# Patient Record
Sex: MALE | Race: WHITE | NOT HISPANIC OR LATINO | Employment: FULL TIME | URBAN - METROPOLITAN AREA
[De-identification: names, ages, dates, MRNs, and addresses within clinical notes are randomized per-mention and may not be internally consistent; named-entity substitution may affect disease eponyms.]

---

## 2017-01-21 ENCOUNTER — OFFICE VISIT (OUTPATIENT)
Dept: URGENT CARE | Facility: CLINIC | Age: 27
End: 2017-01-21
Payer: COMMERCIAL

## 2017-01-21 VITALS
HEIGHT: 72 IN | OXYGEN SATURATION: 97 % | BODY MASS INDEX: 26.41 KG/M2 | HEART RATE: 79 BPM | RESPIRATION RATE: 16 BRPM | TEMPERATURE: 98.9 F | DIASTOLIC BLOOD PRESSURE: 80 MMHG | WEIGHT: 195 LBS | SYSTOLIC BLOOD PRESSURE: 110 MMHG

## 2017-01-21 DIAGNOSIS — M25.551 PAIN OF BOTH HIP JOINTS: ICD-10-CM

## 2017-01-21 DIAGNOSIS — M25.552 PAIN OF BOTH HIP JOINTS: ICD-10-CM

## 2017-01-21 DIAGNOSIS — R09.82 POST-NASAL DRAINAGE: ICD-10-CM

## 2017-01-21 PROCEDURE — 99214 OFFICE O/P EST MOD 30 MIN: CPT | Performed by: PHYSICIAN ASSISTANT

## 2017-01-21 RX ORDER — CETIRIZINE HYDROCHLORIDE 10 MG/1
10 TABLET ORAL DAILY
Qty: 30 TAB | Refills: 1 | Status: SHIPPED | OUTPATIENT
Start: 2017-01-21 | End: 2017-02-07 | Stop reason: SDUPTHER

## 2017-01-21 RX ORDER — FLUTICASONE PROPIONATE 50 MCG
2 SPRAY, SUSPENSION (ML) NASAL DAILY
Qty: 16 G | Refills: 0 | Status: SHIPPED | OUTPATIENT
Start: 2017-01-21 | End: 2017-02-07 | Stop reason: SDUPTHER

## 2017-01-21 ASSESSMENT — ENCOUNTER SYMPTOMS
VOMITING: 0
FEVER: 0
CHILLS: 0
LEG PAIN: 1
TINGLING: 0
NAUSEA: 0

## 2017-01-21 NOTE — PROGRESS NOTES
"Subjective:      Cal Delgadillo is a 26 y.o. male who presents with Leg Pain            Leg Pain  Pertinent negatives include no chills, fever, nausea or vomiting.   last 3-4wks of sorethroat, had flu over xmas, denies ear pain, did have sinus press few weeks ago - resolved. Denies nausea/vomiting/abd pain/diarrhea/rash. Denies PMH of strep. Denies PMH of asthma/bronchitis/pneumonia. C/o raspy - not sore throat, denies seasonal allerg. Tried cough drops/tea. Tried dayquil. Was sick, does not feel sick anymore, now irritated w/ throat, not painful but bothersome in am.     Family hx of beckers muscular dystrophy. Waxing waning lifetime of pain to bilat quads/hips. Denies injury or progression of pain, would like to r/o muscular dystrophy w/ pcp - needs help establishing.      Review of Systems   Constitutional: Negative for fever and chills.   Gastrointestinal: Negative for nausea and vomiting.   Musculoskeletal: Positive for joint pain ( POS for mild pain to bilat hips for years).   Neurological: Negative for tingling.       PMH:  has no past medical history on file.  MEDS:   Current outpatient prescriptions:   •  ibuprofen (MOTRIN) 200 MG Tab, Take 200 mg by mouth every 6 hours as needed., Disp: , Rfl:   ALLERGIES:   Allergies   Allergen Reactions   • Septra Ds [Bactrim Ds] Unspecified     Pt was very young, doesn't remember reaction.     SURGHX: History reviewed. No pertinent past surgical history.  SOCHX:  reports that he has never smoked. He uses smokeless tobacco.  FH: Family history was reviewed, no pertinent findings to report    I have worn a mask for the entire encounter with this patient.      Objective:     /80 mmHg  Pulse 79  Temp(Src) 37.2 °C (98.9 °F)  Resp 16  Ht 1.831 m (6' 0.1\")  Wt 88.451 kg (195 lb)  BMI 26.38 kg/m2  SpO2 97%     Physical Exam   Constitutional: He is oriented to person, place, and time. He appears well-developed and well-nourished. No distress.   HENT:   Head: " Normocephalic and atraumatic.   Right Ear: External ear and ear canal normal. Tympanic membrane is bulging.   Left Ear: External ear and ear canal normal. Tympanic membrane is bulging.   Nose: Nose normal. Right sinus exhibits no maxillary sinus tenderness and no frontal sinus tenderness. Left sinus exhibits no maxillary sinus tenderness and no frontal sinus tenderness.   Mouth/Throat: Uvula is midline and mucous membranes are normal. Posterior oropharyngeal erythema ( mild PND) present. No oropharyngeal exudate, posterior oropharyngeal edema or tonsillar abscesses.   Eyes: Conjunctivae are normal. Right eye exhibits no discharge. Left eye exhibits no discharge. No scleral icterus.   Neck: Neck supple.   Pulmonary/Chest: Effort normal. No respiratory distress. He has no decreased breath sounds. He has no wheezes. He has no rhonchi. He has no rales.   Musculoskeletal: Normal range of motion.   Lymphadenopathy:     He has cervical adenopathy ( mild bilat).   Neurological: He is alert and oriented to person, place, and time. He exhibits normal muscle tone. Coordination normal.   Skin: Skin is warm and dry. He is not diaphoretic. No pallor.   Psychiatric: He has a normal mood and affect.   Nursing note and vitals reviewed.              Assessment/Plan:     1. Post-nasal drainage  Supportive care is reviewed with patient/caregiver - recommend to push PO fluids and electrolytes, Nsaids/tylenol, netti pot/saline irrig, humidifier in home, flonase, ponaris, antihistamines, suspect mild URI / seasonal allerg contributing to PND, f/u w/ PCP for further work up  Return to clinic with lack of resolution or progression of symptoms.    - REFERRAL TO FAMILY PRACTICE  - fluticasone (FLONASE) 50 MCG/ACT nasal spray; Spray 2 Sprays in nose every day.  Dispense: 16 g; Refill: 0  - cetirizine (ZYRTEC) 10 MG Tab; Take 1 Tab by mouth every day.  Dispense: 30 Tab; Refill: 1    2. Pain of both hip joints    - REFERRAL TO FAMILY  PRACTICE

## 2017-01-21 NOTE — MR AVS SNAPSHOT
"        Cal Delgadillo   2017 2:10 PM   Office Visit   MRN: [id number]    Department:  [facility]   Dept Phone:  [phone]    Description:  Male : 1990   Provider:  Jose Trent PA-C           Reason for Visit     Leg Pain in both legs, thighs & hips on & off for 4 years      Allergies as of 2017     Allergen Noted Reactions    Septra Ds [Bactrim Ds] 2016   Unspecified    Pt was very young, doesn't remember reaction.      You were diagnosed with     Post-nasal drainage   [076004]       Pain of both hip joints   [4195688]         Vital Signs     Blood Pressure Pulse Temperature Respirations Height Weight    110/80 mmHg 79 37.2 °C (98.9 °F) 16 1.831 m (6' 0.1\") 88.451 kg (195 lb)    Body Mass Index Oxygen Saturation Smoking Status             26.38 kg/m2 97% Never Smoker          Basic Information     Date Of Birth Sex Race Ethnicity Preferred Language    1990 Male White Unknown English      Health Maintenance        Date Due Completion Dates    IMM HEP B VACCINE (1 of 3 - Primary Series) 1990 ---    IMM HEP A VACCINE (1 of 2 - Standard Series) 1991 ---    IMM HPV VACCINE (1 of 3 - Male 3 Dose Series) 2001 ---    IMM VARICELLA (CHICKENPOX) VACCINE (1 of 2 - 2 Dose Adolescent Series) 2003 ---    IMM DTaP/Tdap/Td Vaccine (1 - Tdap) 2009 ---    IMM INFLUENZA (1) 2016 ---            Current Immunizations     No immunizations on file.      Below and/or attached are the medications your provider expects you to take. Review all of your home medications and newly ordered medications with your provider and/or pharmacist. Follow medication instructions as directed by your provider and/or pharmacist. Please keep your medication list with you and share with your provider. Update the information when medications are discontinued, doses are changed, or new medications (including over-the-counter products) are added; and carry medication information at all times " in the event of emergency situations     Allergies:  SEPTRA DS - Unspecified               Medications  Valid as of: January 21, 2017 -  2:36 PM    Generic Name Brand Name Tablet Size Instructions for use    Cetirizine HCl (Tab) ZYRTEC 10 MG Take 1 Tab by mouth every day.        Fluticasone Propionate (Suspension) FLONASE 50 MCG/ACT Spray 2 Sprays in nose every day.        Ibuprofen (Tab) MOTRIN 200 MG Take 200 mg by mouth every 6 hours as needed.        .                 Medicines prescribed today were sent to:     None      Medication refill instructions:       If your prescription bottle indicates you have medication refills left, it is not necessary to call your provider’s office. Please contact your pharmacy and they will refill your medication.    If your prescription bottle indicates you do not have any refills left, you may request refills at any time through one of the following ways: The online [employer] system (except Urgent Care), by calling your provider’s office, or by asking your pharmacy to contact your provider’s office with a refill request. Medication refills are processed only during regular business hours and may not be available until the next business day. Your provider may request additional information or to have a follow-up visit with you prior to refilling your medication.   *Please Note: Medication refills are assigned a new Rx number when refilled electronically. Your pharmacy may indicate that no refills were authorized even though a new prescription for the same medication is available at the pharmacy. Please request the medicine by name with the pharmacy before contacting your provider for a refill.        Referral     A referral request has been sent to our patient care coordination department. Please allow 3-5 business days for us to process this request and contact you either by phone or mail. If you do not hear from us by the 5th business day, please call us at [phone].             [employer] ([employer]) Access Code: [email or web address]  Expires: 2/20/2017  2:36 PM    [employer] (SC[employer])  A secure, online tool to manage your health information     [employer]’s [employer] (SCNL)® is a secure, online tool that connects you to your personalized health information from the privacy of your home -- day or night - making it very easy for you to manage your healthcare. Once the activation process is completed, you can even access your medical information using the [employer] (SCNL) gladys, which is available for free in the Apple Gladys store or Google Play store.     [employer] (SC[employer]) provides the following levels of access (as shown below):   My Chart Features   [facility] Primary Care Doctor [facility]  Specialists [facility]  Urgent  Care Non-[facility]  Primary Care  Doctor   Email your healthcare team securely and privately 24/7 X X X    Manage appointments: schedule your next appointment; view details of past/upcoming appointments X      Request prescription refills. X      View recent personal medical records, including lab and immunizations X X X X   View health record, including health history, allergies, medications X X X X   Read reports about your outpatient visits, procedures, consult and ER notes X X X X   See your discharge summary, which is a recap of your hospital and/or ER visit that includes your diagnosis, lab results, and care plan. X X       How to register for [employer] (SC[employer]):  1. Go to  [email or web address].  2. Click on the Sign Up Now box, which takes you to the New Member Sign Up page. You will need to provide the following information:  a. Enter your [employer] (SCNL) Access Code exactly as it appears at the top of this page. (You will not need to use this code after you’ve completed the sign-up process. If you do not sign up before the expiration date, you must request a new code.)   b. Enter your date of birth.   c. Enter your home email address.   d. Click Submit, and follow the next screen’s instructions.  3. Create a [employer] (SCNL) ID. This will be your  [employer] login ID and cannot be changed, so think of one that is secure and easy to remember.  4. Create a [employer] password. You can change your password at any time.  5. Enter your Password Reset Question and Answer. This can be used at a later time if you forget your password.   6. Enter your e-mail address. This allows you to receive e-mail notifications when new information is available in [email or web address].  7. Click Sign Up. You can now view your health information.    For assistance activating your [email or web address] account, call [phone]

## 2017-02-07 ENCOUNTER — HOSPITAL ENCOUNTER (OUTPATIENT)
Dept: LAB | Facility: MEDICAL CENTER | Age: 27
End: 2017-02-07
Attending: FAMILY MEDICINE
Payer: COMMERCIAL

## 2017-02-07 ENCOUNTER — OFFICE VISIT (OUTPATIENT)
Dept: MEDICAL GROUP | Facility: MEDICAL CENTER | Age: 27
End: 2017-02-07
Payer: COMMERCIAL

## 2017-02-07 VITALS
HEART RATE: 83 BPM | WEIGHT: 191.8 LBS | BODY MASS INDEX: 25.98 KG/M2 | TEMPERATURE: 99.1 F | HEIGHT: 72 IN | OXYGEN SATURATION: 97 % | SYSTOLIC BLOOD PRESSURE: 106 MMHG | DIASTOLIC BLOOD PRESSURE: 58 MMHG

## 2017-02-07 DIAGNOSIS — R09.82 POST-NASAL DRIP: ICD-10-CM

## 2017-02-07 DIAGNOSIS — M62.81 MUSCLE WEAKNESS: ICD-10-CM

## 2017-02-07 DIAGNOSIS — R09.82 POST-NASAL DRAINAGE: ICD-10-CM

## 2017-02-07 LAB
BASOPHILS # BLD AUTO: 0.4 % (ref 0–1.8)
BASOPHILS # BLD: 0.02 K/UL (ref 0–0.12)
CK SERPL-CCNC: 157 U/L (ref 0–154)
CRP SERPL HS-MCNC: <0.02 MG/DL (ref 0–0.75)
EOSINOPHIL # BLD AUTO: 0.21 K/UL (ref 0–0.51)
EOSINOPHIL NFR BLD: 4.3 % (ref 0–6.9)
ERYTHROCYTE [DISTWIDTH] IN BLOOD BY AUTOMATED COUNT: 40.3 FL (ref 35.9–50)
ERYTHROCYTE [SEDIMENTATION RATE] IN BLOOD BY WESTERGREN METHOD: 2 MM/HOUR (ref 0–15)
HCT VFR BLD AUTO: 39.7 % (ref 42–52)
HGB BLD-MCNC: 14 G/DL (ref 14–18)
IMM GRANULOCYTES # BLD AUTO: 0 K/UL (ref 0–0.11)
IMM GRANULOCYTES NFR BLD AUTO: 0 % (ref 0–0.9)
LYMPHOCYTES # BLD AUTO: 1.96 K/UL (ref 1–4.8)
LYMPHOCYTES NFR BLD: 39.8 % (ref 22–41)
MCH RBC QN AUTO: 30.6 PG (ref 27–33)
MCHC RBC AUTO-ENTMCNC: 35.3 G/DL (ref 33.7–35.3)
MCV RBC AUTO: 86.7 FL (ref 81.4–97.8)
MONOCYTES # BLD AUTO: 0.3 K/UL (ref 0–0.85)
MONOCYTES NFR BLD AUTO: 6.1 % (ref 0–13.4)
NEUTROPHILS # BLD AUTO: 2.43 K/UL (ref 1.82–7.42)
NEUTROPHILS NFR BLD: 49.4 % (ref 44–72)
NRBC # BLD AUTO: 0 K/UL
NRBC BLD AUTO-RTO: 0 /100 WBC
PLATELET # BLD AUTO: 242 K/UL (ref 164–446)
PMV BLD AUTO: 10.4 FL (ref 9–12.9)
RBC # BLD AUTO: 4.58 M/UL (ref 4.7–6.1)
WBC # BLD AUTO: 4.9 K/UL (ref 4.8–10.8)

## 2017-02-07 PROCEDURE — 85652 RBC SED RATE AUTOMATED: CPT

## 2017-02-07 PROCEDURE — 82550 ASSAY OF CK (CPK): CPT

## 2017-02-07 PROCEDURE — 86140 C-REACTIVE PROTEIN: CPT

## 2017-02-07 PROCEDURE — 36415 COLL VENOUS BLD VENIPUNCTURE: CPT

## 2017-02-07 PROCEDURE — 85025 COMPLETE CBC W/AUTO DIFF WBC: CPT

## 2017-02-07 PROCEDURE — 99214 OFFICE O/P EST MOD 30 MIN: CPT | Performed by: FAMILY MEDICINE

## 2017-02-07 RX ORDER — CETIRIZINE HYDROCHLORIDE 10 MG/1
10 TABLET ORAL DAILY
Qty: 30 TAB | Refills: 1 | Status: SHIPPED | OUTPATIENT
Start: 2017-02-07 | End: 2017-05-01

## 2017-02-07 RX ORDER — FLUTICASONE PROPIONATE 50 MCG
2 SPRAY, SUSPENSION (ML) NASAL DAILY
Qty: 16 G | Refills: 0 | Status: SHIPPED | OUTPATIENT
Start: 2017-02-07 | End: 2017-05-01

## 2017-02-07 ASSESSMENT — PATIENT HEALTH QUESTIONNAIRE - PHQ9: CLINICAL INTERPRETATION OF PHQ2 SCORE: 0

## 2017-02-07 NOTE — MR AVS SNAPSHOT
Cal Delgadillo   2017 1:00 PM   Office Visit   MRN: [id number]    Department:  [place] [address]   Dept Phone:  [phone]    Description:  Male : 1990   Provider:  Slim Pringle M.D.           Reason for Visit     Establish Care Bilateral leg pain/weakness      Allergies as of 2017     Allergen Noted Reactions    Septra Ds [Bactrim Ds] 2016   Unspecified    Pt was very young, doesn't remember reaction.      You were diagnosed with     Muscle weakness   [595228]       Post-nasal drainage   [371964]       Post-nasal drip   [209547]         Vital Signs     Blood Pressure Pulse Temperature Height Weight Body Mass Index    106/58 mmHg 83 37.3 °C (99.1 °F) 1.829 m (6') 87 kg (191 lb 12.8 oz) 26.01 kg/m2    Oxygen Saturation Smoking Status                97% Former Smoker          Basic Information     Date Of Birth Sex Race Ethnicity Preferred Language    1990 Male White Unknown English      Your appointments     May 08, 2017  4:20 PM   Established Patient with Slim Pringle M.D.   [facility]    [address]  [address]  [place] [id number] [phone]           You will be receiving a confirmation call a few days before your appointment from our automated call confirmation system.              Problem List              ICD-10-CM Priority Class Noted - Resolved    Muscle weakness M62.81   2017 - Present    Post-nasal drip R09.82   2017 - Present      Health Maintenance        Date Due Completion Dates    IMM HEP B VACCINE (1 of 3 - Primary Series) 1990 ---    IMM HEP A VACCINE (1 of 2 - Standard Series) 1991 ---    IMM HPV VACCINE (1 of 3 - Male 3 Dose Series) 2001 ---    IMM VARICELLA (CHICKENPOX) VACCINE (1 of 2 - 2 Dose Adolescent Series) 2003 ---    IMM DTaP/Tdap/Td Vaccine (1 - Tdap) 2009 ---    IMM INFLUENZA (1) 2016 ---            Current Immunizations     No immunizations  on file.      Below and/or attached are the medications your provider expects you to take. Review all of your home medications and newly ordered medications with your provider and/or pharmacist. Follow medication instructions as directed by your provider and/or pharmacist. Please keep your medication list with you and share with your provider. Update the information when medications are discontinued, doses are changed, or new medications (including over-the-counter products) are added; and carry medication information at all times in the event of emergency situations     Allergies:  SEPTRA DS - Unspecified               Medications  Valid as of: February 07, 2017 -  1:47 PM    Generic Name Brand Name Tablet Size Instructions for use    Cetirizine HCl (Tab) ZYRTEC 10 MG Take 1 Tab by mouth every day.        Fluticasone Propionate (Suspension) FLONASE 50 MCG/ACT Spray 2 Sprays in nose every day. Both nostrils        Ibuprofen (Tab) MOTRIN 200 MG Take 200 mg by mouth every 6 hours as needed.        .                 Medicines prescribed today were sent to:     [facility] [address] [place] [address] & [address] [address]    Phone: [phone] Fax: [phone]    Open 24 Hours?: No      Medication refill instructions:       If your prescription bottle indicates you have medication refills left, it is not necessary to call your provider’s office. Please contact your pharmacy and they will refill your medication.    If your prescription bottle indicates you do not have any refills left, you may request refills at any time through one of the following ways: The online [employer] system (except Urgent Care), by calling your provider’s office, or by asking your pharmacy to contact your provider’s office with a refill request. Medication refills are processed only during regular business hours and may not be available until the next business day. Your provider  may request additional information or to have a follow-up visit with you prior to refilling your medication.   *Please Note: Medication refills are assigned a new Rx number when refilled electronically. Your pharmacy may indicate that no refills were authorized even though a new prescription for the same medication is available at the pharmacy. Please request the medicine by name with the pharmacy before contacting your provider for a refill.        Your To Do List     Future Labs/Procedures Complete By Expires    CBC WITH DIFFERENTIAL  As directed 2/7/2018    CREATINE KINASE  As directed 2/7/2018    WESTERGREN SED RATE  As directed 2/7/2018      Referral     A referral request has been sent to our patient care coordination department. Please allow 3-5 business days for us to process this request and contact you either by phone or mail. If you do not hear from us by the 5th business day, please call us at [phone].           [employer] Access Code: [email or web address]  Expires: 2/20/2017  2:36 PM    [employer]  A secure, online tool to manage your health information     [employer]’s [employer]® is a secure, online tool that connects you to your personalized health information from the privacy of your home -- day or night - making it very easy for you to manage your healthcare. Once the activation process is completed, you can even access your medical information using the [employer] gladys, which is available for free in the Apple Gladys store or Google Play store.     [employer] provides the following levels of access (as shown below):   My Chart Features   [facility] Primary Care Doctor [facility]  Specialists [facility]  Urgent  Care Non-[facility]  Primary Care  Doctor   Email your healthcare team securely and privately 24/7 X X X    Manage appointments: schedule your next appointment; view details of past/upcoming appointments X      Request prescription refills. X      View recent personal medical records, including lab and immunizations X  X X X   View health record, including health history, allergies, medications X X X X   Read reports about your outpatient visits, procedures, consult and ER notes X X X X   See your discharge summary, which is a recap of your hospital and/or ER visit that includes your diagnosis, lab results, and care plan. X X       How to register for [employer]:  1. Go to  [email or web address].  2. Click on the Sign Up Now box, which takes you to the New Member Sign Up page. You will need to provide the following information:  a. Enter your [employer] Access Code exactly as it appears at the top of this page. (You will not need to use this code after you’ve completed the sign-up process. If you do not sign up before the expiration date, you must request a new code.)   b. Enter your date of birth.   c. Enter your home email address.   d. Click Submit, and follow the next screen’s instructions.  3. Create a [employer]are ID. This will be your [employer]are login ID and cannot be changed, so think of one that is secure and easy to remember.  4. Create a [employer] password. You can change your password at any time.  5. Enter your Password Reset Question and Answer. This can be used at a later time if you forget your password.   6. Enter your e-mail address. This allows you to receive e-mail notifications when new information is available in [employer]macare.  7. Click Sign Up. You can now view your health information.    For assistance activating your [employer] account, call [phone]

## 2017-02-07 NOTE — PROGRESS NOTES
Subjective:     Chief Complaint   Patient presents with   • Establish Care     Bilateral leg pain/weakness       History of Present Illness:  Cal Delgadillo is a 26 y.o. male patient new to [facility] who presents today to have medical evaluation/discussion re: muscular weakness, also to establish primary medical care:    Muscle weakness  Patient c/o generalized muscular weakness and pain, particularly in BLE, that has been progressively worsening over the last 5 years.  Specifically, he is noticing bilateral calf aches and weakness, that are worsened by physical activity (e.g. 10-hour shift at work), states that while he is still able to complete his ADLs/responsibilities at work, that it is becoming more difficult as time progresses.  Additionally, he reports decreased  strength, decreased ability to write for a long period of times.    He is also noticing lower back pain that has been progressing.    Pains are better controlled with Ibuprofen 400-800mg PO Qday x1.    Family medical history is positive for BMD carrier status in mother and maternal aunt, BMD in brother (diagnosed at age 4yo), BMD in cousin (diagnosed in mid-20s).      ROS is NEGATIVE for syncope, hemiplegia, paraplegia, tongue biting, saddle paresthesias, bowel or bladder incontinence.      Post-nasal drip  Patient continues to have post-nasal drip with sore throat, unchanged from clinic visit on 01/21/2017 at urgent care.        Patient Active Problem List    Diagnosis Date Noted   • Muscle weakness 02/07/2017   • Post-nasal drip 02/07/2017       Additional History:   Allergies:    Septra ds     Medications:     Current Outpatient Prescriptions Ordered in [facility]   Medication Sig Dispense Refill   • cetirizine (ZYRTEC) 10 MG Tab Take 1 Tab by mouth every day. 30 Tab 1   • fluticasone (FLONASE) 50 MCG/ACT nasal spray Spray 2 Sprays in nose every day. Both nostrils 16 g 0   • ibuprofen (MOTRIN) 200 MG Tab Take 200 mg by mouth every 6 hours as  needed.       No current Epic-ordered facility-administered medications on file.        Past Medical History:   History reviewed. No pertinent past medical history.     Past Surgical History:   History reviewed. No pertinent past surgical history.     Social History:     Social History   Substance Use Topics   • Smoking status: Former Smoker     Types: Cigarettes     Quit date: 2015   • Smokeless tobacco: Former User     Types: Chew     Quit date: 2015   • Alcohol Use: 3.0 oz/week     5 Cans of beer per week        Family History:     Family Status   Relation Status Death Age   • Maternal Grandfather     • Mother Alive    • Father Alive    • Brother Alive    • Maternal Aunt Alive    • Paternal Uncle Alive    • Paternal Grandmother     • Paternal Grandfather     • Cousin Alive    • Maternal Grandmother          Family History   Problem Relation Age of Onset   • Stroke Maternal Grandfather    • Genetic Mother      Valentine's Muscular Dystrophy   • Diabetes Father    • Hypertension Father    • Stroke Father    • Heart Disease Father      murmur s/p heart surgery, arrhythmia   • Sleep Apnea Father    • Genetic Brother      Valentine's Muscular Dystrophy   • Genetic Maternal Aunt      BMD carrier   • Diabetes Paternal Uncle    • Hypertension Paternal Uncle    • Diabetes Paternal Grandmother    • Hypertension Paternal Grandmother    • Diabetes Paternal Grandfather    • Hypertension Paternal Grandfather    • Alcohol/Drug Paternal Grandfather      Alcohol   • Cancer Neg Hx    • Genetic Cousin 25     Valentine's Muscular Dystrophy       ROS:     - Constitutional: Negative for fever, chills, unexpected weight change, and fatigue    - Respiratory: Negative for cough, sputum production, chest congestion, dyspnea, wheezing, and crackles.      - Cardiovascular: Negative for chest pain, palpitations, orthopnea, and bilateral lower extremity edema.     - NOTE: All other systems reviewed and are  negative, except as in HPI.     Objective:   Physical Exam:    Vitals: Blood pressure 106/58, pulse 83, temperature 37.3 °C (99.1 °F), height 1.829 m (6'), weight 87 kg (191 lb 12.8 oz), SpO2 97 %.   BMI: Body mass index is 26.01 kg/(m^2).   General/Constitutional: Vitals as above, Well nourished, well developed male in no acute distress   Head/Eyes:  - Head is grossly normal & atraumatic  - Bilateral conjunctivae clear and not injected, bilateral EOMI, bilateral PERRL   ENT:   - Bilateral external ears grossly normal in appearance, external auditory canals clear & bilateral TMs visualized with appropriate cone of light reflex, hearing grossly intact  - External nares normal in appearance and without discharge/bleeding, bilateral turbinates non-erythematous/non-edematous and without discharge/bleeding  - Good dentition ,  posterior oropharynx without erythema/edema/exudates  Neck: Neck supple, no masses, neck non-tender to palpation, no thyromegaly/goiter   Respiratory: No respiratory distress, bilateral lungs are clear to auscultation in all lung fields (anterior/lateral/posterior), no wheezing/rhonchi/rales   Cardiovascular: Regular rate and rhythm without murmur/gallops/rubs, distal pulses equal and 2+ bilaterally (radial, posterior tibial), no bilateral lower extremity edema   MSK: Gait grossly normal & not antalgic, no tenderness to percussion of vertebral processes, no CVAT, no bilateral SI joint tenderness, strength testing in upper and lower extremities is 5/5 and equal bilaterally, Gross motor movement intact in all 4 extremities, Gross sensation intact to extremities and trunk   Integumentary: No apparent rashes   Psych: Judgment grossly appropriate, no apparent depression/anxiety    Health Maintenance:     - Immunizations:  Patient believes immunizations are up to date.    - I have requested previous records, and will update accordingly.    Imaging/Labs: EKG in NSR    Assessment and Plan:   1. 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  Will evaluate with labwork as below.   - CREATINE KINASE; Future   - POCT EKG   - REFERRAL TO NEUROLOGY   - CBC WITH DIFFERENTIAL; Future   - WESTERGREN SED RATE; Future   - C-REACTIVE PROTEIN CARDIAC    2. Post-nasal drainage  3. Post-nasal drip  Improving, but still uncontrolled.  Meds as below.   - cetirizine (ZYRTEC) 10 MG Tab; Take 1 Tab by mouth every day.  Dispense: 30 Tab; Refill: 1   - fluticasone (FLONASE) 50 MCG/ACT nasal spray; Spray 2 Sprays in nose every day. Both nostrils  Dispense: 16 g; Refill: 0    PLEASE NOTE: This dictation was created using voice recognition software. I have made every reasonable attempt to correct obvious errors, but I expect that there are errors of grammar and possibly content that I did not discover before finalizing the note.

## 2017-02-07 NOTE — ASSESSMENT & PLAN NOTE
Patient continues to have post-nasal drip with sore throat, unchanged from clinic visit on 01/21/2017 at urgent care.

## 2017-02-07 NOTE — ASSESSMENT & PLAN NOTE
Patient c/o generalized muscular weakness and pain, particularly in BLE, that has been progressively worsening over the last 5 years.  Specifically, he is noticing bilateral calf aches and weakness, that are worsened by physical activity (e.g. 10-hour shift at work), states that while he is still able to complete his ADLs/responsibilities at work, that it is becoming more difficult as time progresses.  Additionally, he reports decreased  strength, decreased ability to write for a long period of times.    He is also noticing lower back pain that has been progressing.    Pains are better controlled with Ibuprofen 400-800mg PO Qday x1.    Family medical history is positive for BMD carrier status in mother and maternal aunt, BMD in brother (diagnosed at age 2yo), BMD in cousin (diagnosed in mid-20s).      ROS is NEGATIVE for syncope, hemiplegia, paraplegia, tongue biting, saddle paresthesias, bowel or bladder incontinence.

## 2017-02-07 NOTE — Clinical Note
[facility]  Slim Pringle M.D.  [address]  [place] [address]  Fax: [phone] Authorization for Release/Disclosure of Protected Health Information   Name: MYRNA DELGADILLO : 1990 SSN: [id number]   Address: [place] [address] Phone:    [phone] (home)    I authorize the entity listed below to release/disclose the PHI below to [facility]/Slim Pringle M.D.   Provider or Entity Name:       Address   City, State, [facility]   Phone:      Fax:     Reason for request: continuity of care   Information to be released:    [  ] LAST COLONOSCOPY, including any PATH REPORT [  ] LAST DEXA  [  ] LAST MAMMOGRAM  [  ] LAST PAP [  ] RETINA EXAM REPORT  [  ] IMMUNIZATION RECORDS  [  ] Release all info      [  ] Check here and initial the line next to each item to release ALL health information INCLUDING  _____ Care and treatment for drug and / or alcohol abuse  _____ HIV testing, infection status, or AIDS  _____ Genetic Testing    DATES OF SERVICE OR TIME PERIOD TO BE DISCLOSED: _____________  I understand and acknowledge that:  * This Authorization may be revoked at any time by you in writing, except if your health information has already been used or disclosed.  * Your health information that will be used or disclosed as a result of you signing this authorization could be re-disclosed by the recipient. If this occurs, your re-disclosed health information may no longer be protected by State or Federal laws.  * You may refuse to sign this Authorization. Your refusal will not affect your ability to obtain treatment.  * This Authorization becomes effective upon signing and will  on (date) __________. If no date is indicated, this Authorization will  one (1) year from the signature date.    Name: Myrna Delgadillo    Signature:     Date: 2017

## 2017-03-27 ENCOUNTER — TELEPHONE (OUTPATIENT)
Dept: MEDICAL GROUP | Facility: MEDICAL CENTER | Age: 27
End: 2017-03-27

## 2017-03-27 NOTE — TELEPHONE ENCOUNTER
Pt called stating that he's been having pain all over his body. He also stated that he started using cane to walk because it hurts to walk. Pt is requesting to get a letter of restriction for work since, the pt have to be on his feet at all times. Pt is scheduled to see Dr. Pringle on 05/08/17 @ 4:20pm. Pt was offered to see Dr. Pringle sooner but pt declined.

## 2017-03-28 ENCOUNTER — OFFICE VISIT (OUTPATIENT)
Dept: MEDICAL GROUP | Facility: MEDICAL CENTER | Age: 27
End: 2017-03-28
Payer: COMMERCIAL

## 2017-03-28 ENCOUNTER — HOSPITAL ENCOUNTER (OUTPATIENT)
Dept: LAB | Facility: MEDICAL CENTER | Age: 27
End: 2017-03-28
Attending: FAMILY MEDICINE
Payer: COMMERCIAL

## 2017-03-28 VITALS
WEIGHT: 188 LBS | TEMPERATURE: 99.9 F | DIASTOLIC BLOOD PRESSURE: 68 MMHG | HEART RATE: 83 BPM | BODY MASS INDEX: 25.47 KG/M2 | OXYGEN SATURATION: 95 % | SYSTOLIC BLOOD PRESSURE: 112 MMHG | HEIGHT: 72 IN

## 2017-03-28 DIAGNOSIS — M62.81 MUSCLE WEAKNESS: ICD-10-CM

## 2017-03-28 LAB
APPEARANCE UR: CLEAR
BILIRUB UR QL STRIP.AUTO: NEGATIVE
CK SERPL-CCNC: 173 U/L (ref 0–154)
COLOR UR: YELLOW
EPI CELLS #/AREA URNS HPF: ABNORMAL /HPF
GLUCOSE UR STRIP.AUTO-MCNC: NEGATIVE MG/DL
KETONES UR STRIP.AUTO-MCNC: NEGATIVE MG/DL
LEUKOCYTE ESTERASE UR QL STRIP.AUTO: NEGATIVE
MICRO URNS: ABNORMAL
MUCOUS THREADS #/AREA URNS HPF: ABNORMAL /HPF
NITRITE UR QL STRIP.AUTO: NEGATIVE
PH UR STRIP.AUTO: 5.5 [PH]
PROT UR QL STRIP: NEGATIVE MG/DL
RBC # URNS HPF: ABNORMAL /HPF
RBC UR QL AUTO: ABNORMAL
SP GR UR STRIP.AUTO: 1.01
WBC #/AREA URNS HPF: ABNORMAL /HPF

## 2017-03-28 PROCEDURE — 81001 URINALYSIS AUTO W/SCOPE: CPT

## 2017-03-28 PROCEDURE — 99214 OFFICE O/P EST MOD 30 MIN: CPT | Performed by: FAMILY MEDICINE

## 2017-03-28 PROCEDURE — 36415 COLL VENOUS BLD VENIPUNCTURE: CPT

## 2017-03-28 PROCEDURE — 82550 ASSAY OF CK (CPK): CPT

## 2017-03-28 PROCEDURE — 83874 ASSAY OF MYOGLOBIN: CPT

## 2017-03-28 ASSESSMENT — PAIN SCALES - GENERAL: PAINLEVEL: 5=MODERATE PAIN

## 2017-03-28 NOTE — Clinical Note
March 28, 2017         Patient: Cal Delgadillo   YOB: 1990   Date of Visit: 3/28/2017           To Whom it May Concern:     Cal Delgadillo was seen in my clinic on 03/28/2017. He may return to work on 03/29/2017 on restricted duty.  Patient is limited to activities that he can do in a seated position, and needs to use a cane to ambulate due to bilateral leg weakness.  Patient should not ambulate to perform work, and reasonable accommodations should be made to make his workspace close to the restroom facilities.       I am ordering bloodwork and other diagnostic tests in the meantime.  If you have any questions or concerns, please don't hesitate to call.         Sincerely,           Slim Pringle M.D.  Electronically Signed

## 2017-03-28 NOTE — MR AVS SNAPSHOT
"Cal Delgadillo   3/28/2017 1:40 PM   Office Visit   MRN: [id number]    Department:  [place] [address]   Dept Phone:  [phone]    Description:  Male : 1990   Provider:  Slim Pringle M.D.           Reason for Visit     Pain Bilateral leg pain      Allergies as of 3/28/2017     Allergen Noted Reactions    Septra Ds [Bactrim Ds] 2016   Unspecified    Pt was very young, doesn't remember reaction.      You were diagnosed with     Muscle weakness   [678908]         Vital Signs     Blood Pressure Pulse Temperature Height Weight Body Mass Index    112/68 mmHg 83 37.7 °C (99.9 °F) 1.829 m (6' 0.01\") 85.276 kg (188 lb) 25.49 kg/m2    Oxygen Saturation Smoking Status                95% Former Smoker          Basic Information     Date Of Birth Sex Race Ethnicity Preferred Language    1990 Male White Non- English      Your appointments     May 01, 2017  3:00 PM   New Patient with Chris Magallon M.D.   [facility] (--)    [address], [address]  [facility] [address] [phone]           Please bring Photo ID, Insurance Cards, All Medication Bottles and copies of any legal documents (such as Living Will, Power of ) If speaking a language besides English please bring an adult . Please arrive 30 minutes prior for check in and registration. You will be receiving a confirmation call a few days before your appointment from our automated call confirmation system.            May 08, 2017  4:20 PM   Established Patient with Slim Pringle M.D.   [facility]    [address]  [address]  [facility] [address] [phone]           You will be receiving a confirmation call a few days before your appointment from our automated call confirmation system.              Problem List              ICD-10-CM Priority Class Noted - Resolved    Muscle weakness M62.81   2017 - Present    Post-nasal " drip R09.82   2/7/2017 - Present      Health Maintenance        Date Due Completion Dates    IMM HEP B VACCINE (1 of 3 - Primary Series) 1990 ---    IMM HEP A VACCINE (1 of 2 - Standard Series) 5/11/1991 ---    IMM HPV VACCINE (1 of 3 - Male 3 Dose Series) 5/11/2001 ---    IMM VARICELLA (CHICKENPOX) VACCINE (1 of 2 - 2 Dose Adolescent Series) 5/11/2003 ---    IMM DTaP/Tdap/Td Vaccine (1 - Tdap) 5/11/2009 ---    IMM INFLUENZA (1) 9/1/2016 ---            Current Immunizations     No immunizations on file.      Below and/or attached are the medications your provider expects you to take. Review all of your home medications and newly ordered medications with your provider and/or pharmacist. Follow medication instructions as directed by your provider and/or pharmacist. Please keep your medication list with you and share with your provider. Update the information when medications are discontinued, doses are changed, or new medications (including over-the-counter products) are added; and carry medication information at all times in the event of emergency situations     Allergies:  SEPTRA DS - Unspecified               Medications  Valid as of: March 28, 2017 -  2:31 PM    Generic Name Brand Name Tablet Size Instructions for use    Cetirizine HCl (Tab) ZYRTEC 10 MG Take 1 Tab by mouth every day.        Fluticasone Propionate (Suspension) FLONASE 50 MCG/ACT Spray 2 Sprays in nose every day. Both nostrils        Ibuprofen (Tab) MOTRIN 200 MG Take 200 mg by mouth every 6 hours as needed.        .                 Medicines prescribed today were sent to:     [facility] [address] [place] [address], [place] - [address] [place] [address] AT [facility] & [address] [place] [address]    Phone: [phone] Fax: [phone]    Open 24 Hours?: No      Medication refill instructions:       If your prescription bottle indicates you have medication refills left, it is not necessary to call your provider’s office. Please contact  your pharmacy and they will refill your medication.    If your prescription bottle indicates you do not have any refills left, you may request refills at any time through one of the following ways: The online One-[employer] system (except Urgent Care), by calling your provider’s office, or by asking your pharmacy to contact your provider’s office with a refill request. Medication refills are processed only during regular business hours and may not be available until the next business day. Your provider may request additional information or to have a follow-up visit with you prior to refilling your medication.   *Please Note: Medication refills are assigned a new Rx number when refilled electronically. Your pharmacy may indicate that no refills were authorized even though a new prescription for the same medication is available at the pharmacy. Please request the medicine by name with the pharmacy before contacting your provider for a refill.        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      Referral     A referral request has been sent to our patient care coordination department. Please allow 3-5 business days for us to process this request and contact you either by phone or mail. If you do not hear from us by the 5th business day, please call us at [phone].           One-Song Access Code: Activation code not generated  Current One-[employer] Status: Active

## 2017-03-28 NOTE — ASSESSMENT & PLAN NOTE
Patient with worsening myalgias and muscular weakness.  Patient states he is unable to perform normal work duties as he cannot stand for any prolonged amounts of time nor is he able to ambulate without aid of cane due to his symptoms.    Additionally, patient c/o intermittent chest pain/pressure.    Pain is uncontrolled by OTC analgesics.  Patient verbalizes understanding of previous labs, that they are not suggestive of BMD at this time.  Patient also has difficulty sleeping due to pain/aches.    ROS is NEGATIVE for syncope, hemiplegia, paraplegia, saddle paresthesias, bowel or bladder incontinence, confusion, altered mentation, word finding difficulty, memory loss, facial droop, dysarthria, dysphagia, gait instability.    ROS is NEGATIVE for dizziness, generalized weakness/fatigue, vision/hearing changes, jaw pain/paresthesias, BUE pain/paresthesias/numbness/weakness, palpitations, dyspnea, RUQ abdominal pain, oliguria/anuria, BLE edema.

## 2017-03-29 NOTE — PROGRESS NOTES
Subjective:     Chief Complaint   Patient presents with   • Pain     Bilateral leg pain       History of Present Illness:  Cal Delgadillo is a 26 y.o. male established patient who presents today for worsening myalgias and muscular weakness of BLE:    Muscle weakness  Patient with worsening myalgias and muscular weakness.  Patient states he is unable to perform normal work duties as he cannot stand for any prolonged amounts of time nor is he able to ambulate without aid of cane due to his symptoms.    Additionally, patient c/o intermittent chest pain/pressure.    Pain is uncontrolled by OTC analgesics.  Patient verbalizes understanding of previous labs, that they are not suggestive of BMD at this time.  Patient also has difficulty sleeping due to pain/aches.    ROS is NEGATIVE for syncope, hemiplegia, paraplegia, saddle paresthesias, bowel or bladder incontinence, confusion, altered mentation, word finding difficulty, memory loss, facial droop, dysarthria, dysphagia, gait instability.    ROS is NEGATIVE for dizziness, generalized weakness/fatigue, vision/hearing changes, jaw pain/paresthesias, BUE pain/paresthesias/numbness/weakness, palpitations, dyspnea, RUQ abdominal pain, oliguria/anuria, BLE edema.        Patient Active Problem List    Diagnosis Date Noted   • Muscle weakness 02/07/2017   • Post-nasal drip 02/07/2017       Additional History:   Allergies:    Septra ds     Current Medications:     Current Outpatient Prescriptions   Medication Sig Dispense Refill   • cetirizine (ZYRTEC) 10 MG Tab Take 1 Tab by mouth every day. 30 Tab 1   • fluticasone (FLONASE) 50 MCG/ACT nasal spray Spray 2 Sprays in nose every day. Both nostrils 16 g 0   • ibuprofen (MOTRIN) 200 MG Tab Take 200 mg by mouth every 6 hours as needed.       No current facility-administered medications for this visit.        Social History:     Social History   Substance Use Topics   • Smoking status: Former Smoker     Types: Cigarettes      "Quit date: 02/07/2015   • Smokeless tobacco: Former User     Types: Chew     Quit date: 02/07/2015   • Alcohol Use: 3.0 oz/week     5 Cans of beer per week       ROS:     - NOTE: All other systems reviewed and are negative, except as in HPI.     Objective:   Physical Exam:    Vitals: Blood pressure 112/68, pulse 83, temperature 37.7 °C (99.9 °F), height 1.829 m (6' 0.01\"), weight 85.276 kg (188 lb), SpO2 95 %.   BMI: Body mass index is 25.49 kg/(m^2).   General/Constitutional: Vitals as above, Well nourished, well developed male in no acute distress   Head/Eyes: Head is grossly normal & atraumatic, bilateral conjunctivae clear and not injected, bilateral EOMI, bilateral PERRL   ENT: Bilateral external ears grossly normal in appearance, Hearing grossly intact, External nares normal in appearance and without discharge/bleeding   Respiratory: No respiratory distress, bilateral lungs are clear to ausculation in all lung fields (anterior/lateral/posterior), no wheezing/rhonchi/rales   Cardiovascular: Regular rate and rhythm without murmur/gallops/rubs, distal pulses are intact and equal bilaterally (radial, posterior tibial), no bilateral lower extremity edema   MSK: Gait forward-leaning and antalgic, no tenderness to percussion of vertebral processes, no CVAT, no bilateral SI joint tenderness, patient with bilateral calf/quad/hamstring tenderness to palpation, strength testing in BLE is 4/5 and equal bilaterally due to myalgias/weakness, strength testing in upper extremities is 5/5 and equal bilaterally, Gross motor movement intact in all 4 extremities, Gross sensation intact to extremities and trunk   Integumentary: No apparent rashes   Psych: Judgment grossly appropriate, no apparent depression/anxiety    Imaging/Labs:      - CPK minimally out of range     - ESR & CRP WNL     - CBC relatively WNL    Assessment and Plan:   1. Muscle weakness  Uncontrolled, worsening.   A) Lab evaluation: Repeat CPK due to worsening " nature of s/sx.    - CREATINE KINASE; Future   B) Additional workup: EMG/NCS to evaluate muscular weakness, Echocardiogram to evaluate for chest pain/pressure also for possible muscular dystrophy of heart, UA & Urine Myoglobulin to evaluate for possible increased muscular breakdown.    - REFERRAL TO NEURODIAGNOSTICS (EEG,EP,EMG/NCS/DBS) Modality Requested: EMG/NCS-Comment Extremities    - ECHOCARDIOGRAM COMP W/O CONT; Future    - URINALYSIS, COMPLETE    - MYOGLOBIN URINE QUANT      PLEASE NOTE: This dictation was created using voice recognition software. I have made every reasonable attempt to correct obvious errors, but I expect that there are errors of grammar and possibly content that I did not discover before finalizing the note.

## 2017-03-30 LAB — MYOGLOBIN UR-MCNC: <1 MG/L (ref 0–1)

## 2017-04-14 ENCOUNTER — HOSPITAL ENCOUNTER (OUTPATIENT)
Dept: CARDIOLOGY | Facility: MEDICAL CENTER | Age: 27
End: 2017-04-14
Attending: FAMILY MEDICINE
Payer: COMMERCIAL

## 2017-04-14 DIAGNOSIS — M62.81 MUSCLE WEAKNESS: ICD-10-CM

## 2017-04-14 LAB
LV EJECT FRACT  99904: 60
LV EJECT FRACT MOD 2C 99903: 65.94
LV EJECT FRACT MOD 4C 99902: 48.81
LV EJECT FRACT MOD BP 99901: 59.18

## 2017-04-14 PROCEDURE — 93306 TTE W/DOPPLER COMPLETE: CPT

## 2017-04-14 PROCEDURE — 93306 TTE W/DOPPLER COMPLETE: CPT | Mod: 26 | Performed by: INTERNAL MEDICINE

## 2017-04-21 ENCOUNTER — PATIENT MESSAGE (OUTPATIENT)
Dept: MEDICAL GROUP | Facility: MEDICAL CENTER | Age: 27
End: 2017-04-21

## 2017-04-21 ENCOUNTER — TELEPHONE (OUTPATIENT)
Dept: MEDICAL GROUP | Facility: MEDICAL CENTER | Age: 27
End: 2017-04-21

## 2017-04-21 NOTE — TELEPHONE ENCOUNTER
Phone Number Called: [phone] (home)     Message: Called and notified patient of the message. Rescheduled for 5/16/17 at 4: 20 pm.    Left Message for patient to call back: no

## 2017-04-21 NOTE — TELEPHONE ENCOUNTER
Please call patient to reschedule his appointment with me for at least a few days after 05/12/17 (the date that he is going to have a procedure done).  Thank you.

## 2017-04-24 ENCOUNTER — TELEPHONE (OUTPATIENT)
Dept: MEDICAL GROUP | Facility: MEDICAL CENTER | Age: 27
End: 2017-04-24

## 2017-04-24 ENCOUNTER — HOSPITAL ENCOUNTER (OUTPATIENT)
Dept: LAB | Facility: MEDICAL CENTER | Age: 27
End: 2017-04-24
Attending: FAMILY MEDICINE
Payer: COMMERCIAL

## 2017-04-24 ENCOUNTER — OFFICE VISIT (OUTPATIENT)
Dept: MEDICAL GROUP | Facility: MEDICAL CENTER | Age: 27
End: 2017-04-24
Payer: COMMERCIAL

## 2017-04-24 VITALS
RESPIRATION RATE: 16 BRPM | BODY MASS INDEX: 25.33 KG/M2 | HEART RATE: 63 BPM | OXYGEN SATURATION: 95 % | WEIGHT: 187 LBS | SYSTOLIC BLOOD PRESSURE: 114 MMHG | HEIGHT: 72 IN | DIASTOLIC BLOOD PRESSURE: 72 MMHG | TEMPERATURE: 98.1 F

## 2017-04-24 DIAGNOSIS — R55 SYNCOPE, UNSPECIFIED SYNCOPE TYPE: ICD-10-CM

## 2017-04-24 DIAGNOSIS — M62.81 MUSCLE WEAKNESS: ICD-10-CM

## 2017-04-24 LAB
ALBUMIN SERPL BCP-MCNC: 4.4 G/DL (ref 3.2–4.9)
ALBUMIN/GLOB SERPL: 1.5 G/DL
ALP SERPL-CCNC: 62 U/L (ref 30–99)
ALT SERPL-CCNC: 17 U/L (ref 2–50)
ANION GAP SERPL CALC-SCNC: 6 MMOL/L (ref 0–11.9)
AST SERPL-CCNC: 18 U/L (ref 12–45)
BASOPHILS # BLD AUTO: 0.4 % (ref 0–1.8)
BASOPHILS # BLD: 0.02 K/UL (ref 0–0.12)
BILIRUB SERPL-MCNC: 0.6 MG/DL (ref 0.1–1.5)
BUN SERPL-MCNC: 13 MG/DL (ref 8–22)
CALCIUM SERPL-MCNC: 9.9 MG/DL (ref 8.5–10.5)
CHLORIDE SERPL-SCNC: 105 MMOL/L (ref 96–112)
CO2 SERPL-SCNC: 27 MMOL/L (ref 20–33)
CREAT SERPL-MCNC: 0.68 MG/DL (ref 0.5–1.4)
EOSINOPHIL # BLD AUTO: 0.17 K/UL (ref 0–0.51)
EOSINOPHIL NFR BLD: 3.6 % (ref 0–6.9)
ERYTHROCYTE [DISTWIDTH] IN BLOOD BY AUTOMATED COUNT: 37.7 FL (ref 35.9–50)
GFR SERPL CREATININE-BSD FRML MDRD: >60 ML/MIN/1.73 M 2
GLOBULIN SER CALC-MCNC: 3 G/DL (ref 1.9–3.5)
GLUCOSE SERPL-MCNC: 74 MG/DL (ref 65–99)
HCT VFR BLD AUTO: 43.1 % (ref 42–52)
HGB BLD-MCNC: 14.7 G/DL (ref 14–18)
IMM GRANULOCYTES # BLD AUTO: 0.01 K/UL (ref 0–0.11)
IMM GRANULOCYTES NFR BLD AUTO: 0.2 % (ref 0–0.9)
LYMPHOCYTES # BLD AUTO: 1.79 K/UL (ref 1–4.8)
LYMPHOCYTES NFR BLD: 37.7 % (ref 22–41)
MCH RBC QN AUTO: 29.8 PG (ref 27–33)
MCHC RBC AUTO-ENTMCNC: 34.1 G/DL (ref 33.7–35.3)
MCV RBC AUTO: 87.4 FL (ref 81.4–97.8)
MONOCYTES # BLD AUTO: 0.27 K/UL (ref 0–0.85)
MONOCYTES NFR BLD AUTO: 5.7 % (ref 0–13.4)
NEUTROPHILS # BLD AUTO: 2.49 K/UL (ref 1.82–7.42)
NEUTROPHILS NFR BLD: 52.4 % (ref 44–72)
NRBC # BLD AUTO: 0 K/UL
NRBC BLD AUTO-RTO: 0 /100 WBC
PLATELET # BLD AUTO: 239 K/UL (ref 164–446)
PMV BLD AUTO: 11 FL (ref 9–12.9)
POTASSIUM SERPL-SCNC: 4 MMOL/L (ref 3.6–5.5)
PROT SERPL-MCNC: 7.4 G/DL (ref 6–8.2)
RBC # BLD AUTO: 4.93 M/UL (ref 4.7–6.1)
SODIUM SERPL-SCNC: 138 MMOL/L (ref 135–145)
TSH SERPL DL<=0.005 MIU/L-ACNC: 1.1 UIU/ML (ref 0.3–3.7)
WBC # BLD AUTO: 4.8 K/UL (ref 4.8–10.8)

## 2017-04-24 PROCEDURE — 80053 COMPREHEN METABOLIC PANEL: CPT

## 2017-04-24 PROCEDURE — 85025 COMPLETE CBC W/AUTO DIFF WBC: CPT

## 2017-04-24 PROCEDURE — 36415 COLL VENOUS BLD VENIPUNCTURE: CPT

## 2017-04-24 PROCEDURE — 84443 ASSAY THYROID STIM HORMONE: CPT

## 2017-04-24 PROCEDURE — 99214 OFFICE O/P EST MOD 30 MIN: CPT | Performed by: FAMILY MEDICINE

## 2017-04-24 NOTE — TELEPHONE ENCOUNTER
Regarding: Non-Urgent Medical Question  Contact: [phone]  ----- Message from Slim Pringle M.D. sent at 4/23/2017  8:03 PM PDT -----       ----- Message from Cal Delgadillo to Slim Pringle M.D. sent at 4/21/2017  3:18 PM -----   I decided to stop Vaping my e-cigarette on Tuesday April 18th and my strength in my legs has increased and the pain has decreased. Today Friday April 21st I don't feel I need my cane to walk anymore. I don't feel 100% but I feel more better every day since I quit Vaping.  I would like to make an appointment to see Dr. Pino AVILES.

## 2017-04-24 NOTE — ASSESSMENT & PLAN NOTE
We discussed that his echocardiogram was within normal limits. Also, patient states that since he has stopped weeping on 18 April, his legs have started to feel better since 21 April that he has more strength in his legs.

## 2017-04-24 NOTE — ASSESSMENT & PLAN NOTE
Patient states he had a syncopal episode yesterday morning, states that he was initially laying in bed. However, upon feeling that he needs to urinate, he got up out of bed, was able to ambulate towards the bathroom, and started to urinate. Thereafter, patient Recollects that he was on the floor, did not know how he got there. It took him some seconds to realize that he was previously urinating, but he then started to have persistent nausea and dry heaving. Patient states that he also had vertigo/dizziness.    Of note, patient did stop drinking recently, approximately one week ago.  Otherwise, patient has not changed his alcohol drinking behavior, drinking a combination of 4 drinks per week split between beers and mixed drinks or shots. Additionally, patient denies any use of illicit drugs.    Patient has recently started an L-glutamine vitamin for muscle again, however is unclear on what ingredients are contained therein.    ROS is NEGATIVE for confusion, altered mentation, word finding difficulty, memory loss, facial droop, dysarthria, dysphagia, hemiplegia, gait instability.    ROS is NEGATIVE for dizziness, generalized weakness/fatigue, vision/hearing changes, jaw pain/paresthesias, BUE pain/paresthesias/numbness/weakness, chest pain/pressure, palpitations, dyspnea, RUQ abdominal pain, oliguria/anuria, BLE edema.    ROS is POSITIVE for lightheadedness, otherwise is NEGATIVE for generalized weakness/fatigue, generalized pallor, dizziness, blurred vision, chest pain/pressure, palpitations, tachycardia, dyspnea, hematemesis, hemoptysis, diarrhea, hematochezia, melena, hematuria, hand and foot tingling.

## 2017-04-24 NOTE — TELEPHONE ENCOUNTER
From: Cal Delgadillo  To: Slim Pringle M.D.  Sent: 4/21/2017 3:18 PM PDT  Subject: Non-Urgent Medical Question    I decided to stop Vaping my e-cigarette on Tuesday April 18th and my strength in my legs has increased and the pain has decreased. Today Friday April 21st I don't feel I need my cane to walk anymore. I don't feel 100% but I feel more better every day since I quit Vaping. I would like to make an appointment to see Dr. Pino AVILES.

## 2017-04-24 NOTE — MR AVS SNAPSHOT
"        Cal Delgadillo   2017 1:40 PM   Office Visit   MRN: [id number]    Department:  [place] [address]   Dept Phone:  [phone]    Description:  Male : 1990   Provider:  Slim Pringle M.D.           Reason for Visit     Nicotine Dependence     Syncope yesterday morning      Allergies as of 2017     Allergen Noted Reactions    Septra Ds [Bactrim Ds] 2016   Unspecified    Pt was very young, doesn't remember reaction.      You were diagnosed with     Syncope, unspecified syncope type   [6669773]       Muscle weakness   [[id number]]         Vital Signs     Blood Pressure Pulse Temperature Respirations Height Weight    114/72 mmHg 63 36.7 °C (98.1 °F) 16 1.829 m (6' 0.01\") 84.823 kg (187 lb)    Body Mass Index Oxygen Saturation Smoking Status             25.36 kg/m2 95% Former Smoker         Basic Information     Date Of Birth Sex Race Ethnicity Preferred Language    1990 Male White Non- English      Your appointments     May 01, 2017  3:00 PM   New Patient with Chris Magallon M.D.   [facility] (--)    [address], [facility] [address]  [facility] [address] [phone]           Please bring Photo ID, Insurance Cards, All Medication Bottles and copies of any legal documents (such as Living Will, Power of ) If speaking a language besides English please bring an adult . Please arrive 30 minutes prior for check in and registration. You will be receiving a confirmation call a few days before your appointment from our automated call confirmation system.            May 12, 2017  8:45 AM   ELECTROMYOGRAPHY with NEURODIAGNOSTIC EMG LAB   [facility] (--)    [address], [address]  [place] [id number] [phone]           N/A            May 16, 2017  4:20 PM   Established Patient with Slim Pringle M.D.   [facility]    [address]  [place] [address]   "   [phone]           You will be receiving a confirmation call a few days before your appointment from our automated call confirmation system.              Problem List              ICD-10-CM Priority Class Noted - Resolved    Muscle weakness M62.81   2/7/2017 - Present    Post-nasal drip R09.82   2/7/2017 - Present    Syncope R55   4/24/2017 - Present      Health Maintenance        Date Due Completion Dates    IMM HEP B VACCINE (1 of 3 - Primary Series) 1990 ---    IMM HEP A VACCINE (1 of 2 - Standard Series) 5/11/1991 ---    IMM HPV VACCINE (1 of 3 - Male 3 Dose Series) 5/11/2001 ---    IMM VARICELLA (CHICKENPOX) VACCINE (1 of 2 - 2 Dose Adolescent Series) 5/11/2003 ---    IMM DTaP/Tdap/Td Vaccine (1 - Tdap) 5/11/2009 ---            Current Immunizations     No immunizations on file.      Below and/or attached are the medications your provider expects you to take. Review all of your home medications and newly ordered medications with your provider and/or pharmacist. Follow medication instructions as directed by your provider and/or pharmacist. Please keep your medication list with you and share with your provider. Update the information when medications are discontinued, doses are changed, or new medications (including over-the-counter products) are added; and carry medication information at all times in the event of emergency situations     Allergies:  SEPTRA DS - Unspecified               Medications  Valid as of: April 24, 2017 -  2:24 PM    Generic Name Brand Name Tablet Size Instructions for use    Cetirizine HCl (Tab) ZYRTEC 10 MG Take 1 Tab by mouth every day.        Fluticasone Propionate (Suspension) FLONASE 50 MCG/ACT Spray 2 Sprays in nose every day. Both nostrils        Ibuprofen (Tab) MOTRIN 200 MG Take 200 mg by mouth every 6 hours as needed.        .                 Medicines prescribed today were sent to:     [id number] - [place] - [address] AT [facility] [address]    [address] [facility] [address]    Phone: [phone] Fax: [phone]    Open 24 Hours?: No      Medication refill instructions:       If your prescription bottle indicates you have medication refills left, it is not necessary to call your provider’s office. Please contact your pharmacy and they will refill your medication.    If your prescription bottle indicates you do not have any refills left, you may request refills at any time through one of the following ways: The online [employer] system (except Urgent Care), by calling your provider’s office, or by asking your pharmacy to contact your provider’s office with a refill request. Medication refills are processed only during regular business hours and may not be available until the next business day. Your provider may request additional information or to have a follow-up visit with you prior to refilling your medication.   *Please Note: Medication refills are assigned a new Rx number when refilled electronically. Your pharmacy may indicate that no refills were authorized even though a new prescription for the same medication is available at the pharmacy. Please request the medicine by name with the pharmacy before contacting your provider for a refill.        Your To Do List     Future Labs/Procedures Complete By Expires    CBC WITH DIFFERENTIAL  As directed 4/24/2018    COMP METABOLIC PANEL  As directed 4/24/2018    TSH WITH REFLEX TO FT4  As directed 4/24/2018    US-CAROTID DOPPLER  As directed 4/24/2018         [employer] Access Code: Activation code not generated  Current [employer] Status: Active

## 2017-04-24 NOTE — PROGRESS NOTES
Subjective:     Chief Complaint   Patient presents with   • Nicotine Dependence   • Syncope     yesterday morning       History of Present Illness:  Cal Delgadillo is a 26 y.o. male established patient who presents today to discuss syncopal episode that occurred yesterday morning as well as topical nicotine dependence:    Syncope  Patient states he had a syncopal episode yesterday morning, states that he was initially laying in bed. However, upon feeling that he needs to urinate, he got up out of bed, was able to ambulate towards the bathroom, and started to urinate. Thereafter, patient Recollects that he was on the floor, did not know how he got there. It took him some seconds to realize that he was previously urinating, but he then started to have persistent nausea and dry heaving. Patient states that he also had vertigo/dizziness.    Of note, patient did stop drinking recently, approximately one week ago.  Otherwise, patient has not changed his alcohol drinking behavior, drinking a combination of 4 drinks per week split between beers and mixed drinks or shots. Additionally, patient denies any use of illicit drugs.    Patient has recently started an L-glutamine vitamin for muscle again, however is unclear on what ingredients are contained therein.    ROS is NEGATIVE for confusion, altered mentation, word finding difficulty, memory loss, facial droop, dysarthria, dysphagia, hemiplegia, gait instability.    ROS is NEGATIVE for dizziness, generalized weakness/fatigue, vision/hearing changes, jaw pain/paresthesias, BUE pain/paresthesias/numbness/weakness, chest pain/pressure, palpitations, dyspnea, RUQ abdominal pain, oliguria/anuria, BLE edema.    ROS is POSITIVE for lightheadedness, otherwise is NEGATIVE for generalized weakness/fatigue, generalized pallor, dizziness, blurred vision, chest pain/pressure, palpitations, tachycardia, dyspnea, hematemesis, hemoptysis, diarrhea, hematochezia, melena, hematuria,  "hand and foot tingling.    Muscle weakness  We discussed that his echocardiogram was within normal limits. Also, patient states that since he has stopped weeping on 18 April, his legs have started to feel better since 21 April that he has more strength in his legs.        Patient Active Problem List    Diagnosis Date Noted   • Syncope 04/24/2017   • Muscle weakness 02/07/2017   • Post-nasal drip 02/07/2017       Additional History:   Allergies:    Septra ds     Current Medications:     Current Outpatient Prescriptions   Medication Sig Dispense Refill   • cetirizine (ZYRTEC) 10 MG Tab Take 1 Tab by mouth every day. 30 Tab 1   • fluticasone (FLONASE) 50 MCG/ACT nasal spray Spray 2 Sprays in nose every day. Both nostrils 16 g 0   • ibuprofen (MOTRIN) 200 MG Tab Take 200 mg by mouth every 6 hours as needed.       No current facility-administered medications for this visit.        Social History:     Social History   Substance Use Topics   • Smoking status: Former Smoker     Types: Cigarettes     Quit date: 02/07/2015   • Smokeless tobacco: Former User     Types: Chew     Quit date: 02/07/2015   • Alcohol Use: 2.4 oz/week     2 Cans of beer, 2 Shots of liquor per week       ROS:     - ROS is NEGATIVE for: cold or heat intolerance, anxiety/depression, chest pain/pressure, hair thickening/coarsening/falling out/thinning, skin changes, diarrhea/constipation.    - NOTE: All other systems reviewed and are negative, except as in HPI.     Objective:   Physical Exam:    Vitals: Blood pressure 114/72, pulse 63, temperature 36.7 °C (98.1 °F), resp. rate 16, height 1.829 m (6' 0.01\"), weight 84.823 kg (187 lb), SpO2 95 %.   BMI: Body mass index is 25.36 kg/(m^2).   General/Constitutional: Vitals as above, Well nourished, well developed male in no acute distress   Head/Eyes: Head is grossly normal & atraumatic, bilateral conjunctivae clear and not injected, bilateral EOMI, bilateral PERRL   ENT: Bilateral external ears grossly " normal in appearance, Hearing grossly intact, External nares normal in appearance and without discharge/bleeding, no carotid bruits auscultated, no palpable neck masses apart from mildly enlarged lymph nodes\   Respiratory: No respiratory distress, bilateral lungs are clear to ausculation in all lung fields (anterior/lateral/posterior), no wheezing/rhonchi/rales   Cardiovascular: Regular rate and rhythm without murmur/gallops/rubs, distal pulses are intact and equal bilaterally (radial, posterior tibial), no bilateral lower extremity edema   MSK: Gait grossly normal & not antalgic   Integumentary: No apparent rashes   Psych: Judgment grossly appropriate, no apparent depression/anxiety    Assessment and Plan:   1. Syncope, unspecified syncope type  Uncontrolled, need to pursue further workup as below.  Suspect vasovagal syncope, however we'll do basic workup as below in addition to tilt table testing.   - CBC WITH DIFFERENTIAL; Future   - COMP METABOLIC PANEL; Future   - TSH WITH REFLEX TO FT4; Future   - US-CAROTID DOPPLER; Future   - Tilt Table Test    2. Muscle weakness  Uncontrolled, however resolving. We will continue to monitor this, and he will continue with his referral with Dr. Magallon.    RTC in: 05/16 with me to f/u after appt with Dr. Magallon    PLEASE NOTE: This dictation was created using voice recognition software. I have made every reasonable attempt to correct obvious errors, but I expect that there are errors of grammar and possibly content that I did not discover before finalizing the note.

## 2017-04-24 NOTE — Clinical Note
April 24, 2017         Patient: Cal Delgadillo   YOB: 1990   Date of Visit: 4/24/2017           To Whom it May Concern:     Cal Delgadillo was seen in my clinic on 04/24/2017. He may return to work on 04/25/2017, and may increase his job duties beyond light duty.  He has been given appropriate precautions, to take it easy and to stop at first signs of muscular weakness.     If you have any questions or concerns, please don't hesitate to call.        Sincerely,           Slim Pringle M.D.  Electronically Signed

## 2017-04-25 ENCOUNTER — TELEPHONE (OUTPATIENT)
Dept: MEDICAL GROUP | Facility: MEDICAL CENTER | Age: 27
End: 2017-04-25

## 2017-04-25 NOTE — TELEPHONE ENCOUNTER
[facility] ([id number]) called wondering if the Tilt Table Test that was ordered is for Passive or Nitro. They needs to know the specifics before they can schedule the pt for an appointment.

## 2017-04-27 DIAGNOSIS — R55 SYNCOPE, UNSPECIFIED SYNCOPE TYPE: ICD-10-CM

## 2017-04-27 NOTE — TELEPHONE ENCOUNTER
I called [facility] to let her know about the order, but she stated that she can't take verbal order. It needs to be put in the system.

## 2017-04-28 ENCOUNTER — APPOINTMENT (OUTPATIENT)
Dept: URGENT CARE | Facility: CLINIC | Age: 27
End: 2017-04-28
Payer: COMMERCIAL

## 2017-05-01 ENCOUNTER — OFFICE VISIT (OUTPATIENT)
Dept: NEUROLOGY | Facility: MEDICAL CENTER | Age: 27
End: 2017-05-01
Payer: COMMERCIAL

## 2017-05-01 ENCOUNTER — TELEPHONE (OUTPATIENT)
Dept: MEDICAL GROUP | Facility: MEDICAL CENTER | Age: 27
End: 2017-05-01

## 2017-05-01 VITALS
OXYGEN SATURATION: 96 % | TEMPERATURE: 98.8 F | HEART RATE: 71 BPM | BODY MASS INDEX: 25.87 KG/M2 | DIASTOLIC BLOOD PRESSURE: 74 MMHG | SYSTOLIC BLOOD PRESSURE: 114 MMHG | HEIGHT: 72 IN | WEIGHT: 191 LBS

## 2017-05-01 DIAGNOSIS — G71.01 BECKER'S MUSCULAR DYSTROPHY (HCC): Primary | ICD-10-CM

## 2017-05-01 PROCEDURE — 99205 OFFICE O/P NEW HI 60 MIN: CPT | Performed by: PSYCHIATRY & NEUROLOGY

## 2017-05-01 ASSESSMENT — ENCOUNTER SYMPTOMS
MEMORY LOSS: 0
TREMORS: 1
LOSS OF CONSCIOUSNESS: 1
WEAKNESS: 1
PALPITATIONS: 0
CONSTIPATION: 0
SENSORY CHANGE: 0
FOCAL WEAKNESS: 1
TINGLING: 0
BLURRED VISION: 0

## 2017-05-01 NOTE — PROGRESS NOTES
Subjective:      Cal Delgadillo is a 26 y.o. male who presents for consultation from the office of Dr. Pringle, with a history of progressive generalized muscular weakness, in the setting of a family history of Valentine's muscular dystrophy.     HPI    Mr. Delgadillo is a pleasant 26-year-old right-handed  gentleman who is weakness and muscle aches and pains became much more profound over the last couple of years, though he has always had problems with weakness dating back to early teens. At the time, even in high school, the only way he played water polo was by using his arms. His endurance in other sports never allowed him to keep him up with his other fellow athletes. Football, soccer, running, etc. were all pretty much out of reach because he would simply fatigue earlier. As well, he would begin to have profound muscle aches and pains and tenderness. He would sit and rest, there was some brief improvement, but the symptoms would always recur. He denied rash, neuroendocrine abnormalities, cataracts, diabetes, hypertension, etc. Cognition was always intact. Over the last several years, the myalgias and muscle weakness have occurred much more quickly, endurance has gone through the floor. Over the last 6 months or so, the upper extremities are now involved. Even using the hands when writing or opening jars has become an impossibility. The cramps in the muscles, pain, etc. all are now incapacitating. Rest and sleep provide less benefit.    He denies any sensory loss, paresthesias or dysesthesias. Cognition remains intact, he denies swallowing difficulties, ptosis, double vision, change in voice volume quality, change in hearing acuity, etc.    About 7 days ago, while going to the bathroom at night, he suddenly collapsed to the ground. There was no warning, he woke very dizzy with significant nausea and vomiting. Interestingly, he had put his clothes back on and he was not incontinent when he awakened! He  "developed a very cold sweat, but was then able to stand up, took a shower to wash himself off, and then went about his business as per usual. He denies any history of orthostatic dizziness and syncope, early satiety with nausea and vomiting, constipation, sexual dysfunction or urinary retention. Interestingly, his brother, also diagnosed with a condition, has suffered from several events of similar nature. Symptomatically, over-the-counter NSAIDs provide no benefit from the myalgias and muscle tenderness. Interestingly, when he gave up smoking, there seemed to be some improvement, he gave up vaping several weeks ago, and this too provided notable benefit.    There is no medical history of note. Surgery as well is a nonissue. Multiple family members suffer from Valentine's muscular dystrophy, his brother was diagnosed 3 years of age, a male cousin on his mother's side of the family also suffers from the same condition. His father has hypertension, CAD and diabetes, his mother was found to be a carrier of the disorder, she has no neurologic disease otherwise. He has a 1-year-old daughter. He will drink alcohol occasionally, this is been increased since he stopped vaping, now maybe one beer every day. He is a member of the [place] [employer] services in the [employer]. He is on no medications.    Review of Systems   Constitutional: Positive for malaise/fatigue.   Eyes: Negative for blurred vision.   Cardiovascular: Negative for palpitations and leg swelling.   Gastrointestinal: Negative for constipation.   Genitourinary: Negative for urgency and frequency.   Neurological: Positive for tremors, focal weakness, loss of consciousness and weakness. Negative for tingling and sensory change.   Psychiatric/Behavioral: Negative for memory loss.   All other systems reviewed and are negative.       Objective:     /74 mmHg  Pulse 71  Temp(Src) 37.1 °C (98.8 °F)  Ht 1.829 m (6' 0.01\")  Wt 86.637 kg (191 lb)  BMI 25.90 kg/m2  SpO2 96% "     Physical Exam    He appears in no acute distress. Vital signs are stable. There is no malar rash. There is no evidence of hair loss. The neck is supple. When he speaks, there is limited movement of the jaw, carotid pulses are present without asymmetry. Cardiac evaluation reveals a regular rhythm. There is no lower extremity edema. There is no past cavus.    Fully oriented, there is no aphasia, apraxia, or inattention.    PERRLA/EOMI, funduscopic exam reveals sharp disc margins, there is no evidence of cataracts, visual fields are full to finger counting confrontation, there is weakness with lip apposition, he has some difficulty protruding the tongue as well as moving from side to side, there is no myotonia or tongue atrophy and fasciculations. Sensory exam is intact across the midline to temperature and light touch, facial movements are still symmetric. Shoulder shrug is symmetric but there is weakness as well as with head rotation to either side.    Tone is normal throughout, there is no localized atrophy or fasciculations. There is mild pseudohypertrophy of the calves. There is weakness in the proximal muscle groups especially and bilaterally, slightly more on the left side. There is still weakness distally in the hands as well as feet. Reflexes are present at all points without asymmetries, both toes are downgoing.    He has difficulty standing up from a seated position as well as from the floor, he uses his arms, Pebbles's maneuver is not demonstrated. He is still unsteady when he walks, there is no foot drop. Repetitive movements with the feet and hands are slightly slowed, there is no appendicular dystaxia. Heel and toe walking is impaired.    Sensory exam is intact to vibration, temperature and light touch. Romberg is absent.     Assessment/Plan:     1. 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 The other possibilities would include type II myotonic dystrophy, but family history of male involvement only, family members already testing positive for the genetic variant, and the absence of associated endocrinopathies, cataracts, etc., make this latter possibility of bit of a stretch. I do not think additional blood work is indicated at this time. Treatments are limited, but I did encourage him to remain free of tobacco and nicotinic products at this time given their effect on the weakness that he has already developed. If this is Valentine variant muscular dystrophy, he understands this will progress slowly and steadily. The episode of what sounds like his micturition syncope is not unheard of with this condition, though it is rare, whether or not there is clear cause and effect remains to be seen but he lacks a history suggestive of progressive dysautonomia. Face-to-face time was spent reviewing all of the above. I will follow-up with him after diagnostics are complete.    Time: Evaluation 60 minutes for exam, review, discussion, and education, high complexity  Discussion: As mentioned in the assessment, over 60% of the time spent face-to-face counseling and coordinating care

## 2017-05-01 NOTE — Clinical Note
May 3, 2017         Patient: Cal Delgadillo   YOB: 1990   Date of Visit: 5/1/2017           To Whom it May Concern:     Cal Delgadillo was seen in my clinic on 04/27/17. As he is undergoing ongoing evaluation and testing, I am extending my recommendation for light duty at work until at least 05/12/17.  Based on the results of the evaluation and testing, I will make further recommendations.     If you have any questions or concerns, please don't hesitate to call.        Sincerely,           Slim Pringle M.D.  Electronically Signed

## 2017-05-01 NOTE — MR AVS SNAPSHOT
"        Cal Delgadillo   2017 3:00 PM   Office Visit   MRN: [id number]    Department:  [facility]   Dept Phone:  [phone]    Description:  Male : 1990   Provider:  Chris Magallon M.D.           Reason for Visit     New Patient both leg weakness and soarness hx of valentine's muscular dystrophy      Allergies as of 2017     Allergen Noted Reactions    Septra Ds [Bactrim Ds] 2016   Unspecified    Pt was very young, doesn't remember reaction.      You were diagnosed with     Valentine's muscular dystrophy (CMS-[facility])   [749584]  -  Primary       Vital Signs     Blood Pressure Pulse Temperature Height Weight Body Mass Index    114/74 mmHg 71 37.1 °C (98.8 °F) 1.829 m (6' 0.01\") 86.637 kg (191 lb) 25.90 kg/m2    Oxygen Saturation Smoking Status                96% Former Smoker          Basic Information     Date Of Birth Sex Race Ethnicity Preferred Language    1990 Male White Non- English      Your appointments     May 12, 2017  8:45 AM   ELECTROMYOGRAPHY with NEURODIAGNOSTIC EMG LAB   [facility] Neurology (--)    [address], [address]  [facility] [id number] [phone]           N/A            May 16, 2017  4:20 PM   Established Patient with Slim Pringle M.D.   [facility])    [address]  [address]  [facility] [id number] [phone]           You will be receiving a confirmation call a few days before your appointment from our automated call confirmation system.            May 19, 2017  2:00 PM   US HEAD/NECK 60 with [address] US 1   [facility] IMAGING - ULTRASOUND - [address])    [address]  [facility] [id number] [phone]            2017  2:40 PM   Follow Up Visit with Chris Magallon M.D.   [facility] Neurology (--)    [address], [address]  [facility] [address] [phone]           You will be receiving a confirmation call a few days before your " appointment from our automated call confirmation system.              Problem List              ICD-10-CM Priority Class Noted - Resolved    Post-nasal drip R09.82   2/7/2017 - Present    Syncope R55   4/24/2017 - Present    Valentine's muscular dystrophy (CMS-[facility]) G71.0   5/1/2017 - Present      Health Maintenance        Date Due Completion Dates    IMM HEP B VACCINE (1 of 3 - Primary Series) 1990 ---    IMM HEP A VACCINE (1 of 2 - Standard Series) 5/11/1991 ---    IMM HPV VACCINE (1 of 3 - Male 3 Dose Series) 5/11/2001 ---    IMM VARICELLA (CHICKENPOX) VACCINE (1 of 2 - 2 Dose Adolescent Series) 5/11/2003 ---    IMM DTaP/Tdap/Td Vaccine (1 - Tdap) 5/11/2009 ---            Current Immunizations     No immunizations on file.      Below and/or attached are the medications your provider expects you to take. Review all of your home medications and newly ordered medications with your provider and/or pharmacist. Follow medication instructions as directed by your provider and/or pharmacist. Please keep your medication list with you and share with your provider. Update the information when medications are discontinued, doses are changed, or new medications (including over-the-counter products) are added; and carry medication information at all times in the event of emergency situations     Allergies:  SEPTRA DS - Unspecified               Medications  Valid as of: May 01, 2017 -  4:07 PM    Generic Name Brand Name Tablet Size Instructions for use    Ibuprofen (Tab) MOTRIN 200 MG Take 200 mg by mouth every 6 hours as needed.        .                 Medicines prescribed today were sent to:     [facility] [address] [place] [address] - [address] [facility]    [address] [facility] [address]    Phone: [phone] Fax: [phone]    Open 24 Hours?: Yes      Medication refill instructions:       If your prescription bottle indicates you have medication refills left, it is not necessary to call  your provider’s office. Please contact your pharmacy and they will refill your medication.    If your prescription bottle indicates you do not have any refills left, you may request refills at any time through one of the following ways: The online [employer] system (except Urgent Care), by calling your provider’s office, or by asking your pharmacy to contact your provider’s office with a refill request. Medication refills are processed only during regular business hours and may not be available until the next business day. Your provider may request additional information or to have a follow-up visit with you prior to refilling your medication.   *Please Note: Medication refills are assigned a new Rx number when refilled electronically. Your pharmacy may indicate that no refills were authorized even though a new prescription for the same medication is available at the pharmacy. Please request the medicine by name with the pharmacy before contacting your provider for a refill.           [employer] Access Code: Activation code not generated  Current [employer] Status: Active

## 2017-05-03 ENCOUNTER — TELEPHONE (OUTPATIENT)
Dept: MEDICAL GROUP | Facility: MEDICAL CENTER | Age: 27
End: 2017-05-03

## 2017-05-03 NOTE — TELEPHONE ENCOUNTER
I printed and signed letter.  Work restriction only extended until 05/12.  Once I get results for testing, I'll see if he needs to be extended further.

## 2017-05-03 NOTE — TELEPHONE ENCOUNTER
Pt is rescheduled to see Dr. Pringle on 05/25/17@ 2:40pm. Please extend his letter for work restriction until the 05/25/17. Pt will be picking up the letter on Friday 05/05/17

## 2017-05-03 NOTE — TELEPHONE ENCOUNTER
Pt requested to extended it until 05/25 but Dr Pringle will only be extending his work restriction until 05/12.

## 2017-05-09 ENCOUNTER — TELEPHONE (OUTPATIENT)
Dept: NEUROLOGY | Facility: MEDICAL CENTER | Age: 27
End: 2017-05-09

## 2017-05-09 NOTE — TELEPHONE ENCOUNTER
Pt is calling and stating that Radha never received the faxed to get the genetic testing done that Dr. Magallon wanted to do. [place] form re-faxed to [phone]. Pt notified. KA

## 2017-05-12 ENCOUNTER — NON-PROVIDER VISIT (OUTPATIENT)
Dept: NEUROLOGY | Facility: MEDICAL CENTER | Age: 27
End: 2017-05-12
Payer: COMMERCIAL

## 2017-05-12 DIAGNOSIS — M62.81 MUSCLE WEAKNESS: ICD-10-CM

## 2017-05-12 PROCEDURE — 95886 MUSC TEST DONE W/N TEST COMP: CPT

## 2017-05-12 PROCEDURE — 95908 NRV CNDJ TST 3-4 STUDIES: CPT

## 2017-05-12 NOTE — PROCEDURES
DATE OF SERVICE:  05/12/2017    REFERRING PHYSICIAN:  Slim Pringle MD    REASON FOR STUDY:  EMG nerve conduction studies were requested in lower   extremities because of family history of Valentine muscular dystrophy.    The patient has been complaining of pain in the muscles and limitations of   activity.    In the right lower extremity, peroneal, tibial, and conduction studies were   entirely normal with normal compound muscle action potentials, latencies and   proximal conduction velocity.    In light of this suspected dystrophy, only proximal muscles were evaluated,   which included gluteus medius, gluteus connie, the iliopsoas and right lumbar   and lower thoracic paraspinal muscles.    All the muscles had normal insertional activity, normal motor unit   configuration and normal recruitment patterns.    IMPRESSION:  This is a normal EMG and nerve conduction study.  I cannot show   any definite evidence of a myopathy.  Clinical correlation suggested.       ____________________________________     MD ROB CALVO / MAX    DD:  05/12/2017 09:56:52  DT:  05/12/2017 10:27:06    D#:  [id number]  Job#:  [id number]    cc: Slim Pringle MD

## 2017-05-12 NOTE — MR AVS SNAPSHOT
Cal Delgadillo   2017 8:45 AM   Non-Provider Visit   MRN: [id number]    Department:  [facility]   Dept Phone:  [phone]    Description:  Male : 1990   Provider:  NEURODIAGNOSTIC EMG LAB           Allergies as of 2017     Allergen Noted Reactions    Septra Ds [Bactrim Ds] 2016   Unspecified    Pt was very young, doesn't remember reaction.      You were diagnosed with     Muscle weakness   [[id number]]         Vital Signs     Smoking Status                   Former Smoker           Basic Information     Date Of Birth Sex Race Ethnicity Preferred Language    1990 Male White Non- English      Your appointments     May 19, 2017 10:00 AM   Tilt Table with [facility], [facility] EXAM 11   [facility] (--)    [address] [facility] [address] [phone]           NPO x 4hours, may take meds. Unless Diabetic, may have light meal.  home            May 19, 2017  2:00 PM   US HEAD/NECK 60 with [address] US 1   [facility] IMAGING - ULTRASOUND - [address])    [address]  [facility] [address] [phone]            May 25, 2017  2:40 PM   Established Patient with Slim Pringle M.D.   [facility])    [address]  [address]  [facility] [address] [phone]           You will be receiving a confirmation call a few days before your appointment from our automated call confirmation system.            2017  2:40 PM   Follow Up Visit with Chris Magallon M.D.   [facility] Neurology (--)    [address], [address]  [facility] [address] [phone]           You will be receiving a confirmation call a few days before your appointment from our automated call confirmation system.              Problem List              ICD-10-CM Priority Class Noted - Resolved    Post-nasal drip R09.82   2017 - Present    Syncope R55   2017 - Present    Valentine's muscular dystrophy G71.0   2017  - Present      Health Maintenance        Date Due Completion Dates    IMM HEP B VACCINE (1 of 3 - Primary Series) 1990 ---    IMM HEP A VACCINE (1 of 2 - Standard Series) 5/11/1991 ---    IMM VARICELLA (CHICKENPOX) VACCINE (1 of 2 - 2 Dose Adolescent Series) 5/11/2003 ---    IMM DTaP/Tdap/Td Vaccine (1 - Tdap) 5/11/2009 ---            Current Immunizations     No immunizations on file.      Below and/or attached are the medications your provider expects you to take. Review all of your home medications and newly ordered medications with your provider and/or pharmacist. Follow medication instructions as directed by your provider and/or pharmacist. Please keep your medication list with you and share with your provider. Update the information when medications are discontinued, doses are changed, or new medications (including over-the-counter products) are added; and carry medication information at all times in the event of emergency situations     Allergies:  SEPTRA DS - Unspecified               Medications  Valid as of: May 12, 2017 -  9:54 AM    Generic Name Brand Name Tablet Size Instructions for use    Ibuprofen (Tab) MOTRIN 200 MG Take 200 mg by mouth every 6 hours as needed.        .                 Medicines prescribed today were sent to:     [facility] [address] [place] [address] [address] [place] [address] [address] [facility] [address]    Phone: [phone] Fax: [phone]    Open 24 Hours?: Yes      Medication refill instructions:       If your prescription bottle indicates you have medication refills left, it is not necessary to call your provider’s office. Please contact your pharmacy and they will refill your medication.    If your prescription bottle indicates you do not have any refills left, you may request refills at any time through one of the following ways: The online [employer] system (except Urgent Care), by calling your provider’s office, or by asking your  pharmacy to contact your provider’s office with a refill request. Medication refills are processed only during regular business hours and may not be available until the next business day. Your provider may request additional information or to have a follow-up visit with you prior to refilling your medication.   *Please Note: Medication refills are assigned a new Rx number when refilled electronically. Your pharmacy may indicate that no refills were authorized even though a new prescription for the same medication is available at the pharmacy. Please request the medicine by name with the pharmacy before contacting your provider for a refill.           [employer] Access Code: Activation code not generated  Current My [employer] Status: Active

## 2017-05-19 ENCOUNTER — HOSPITAL ENCOUNTER (OUTPATIENT)
Dept: RADIOLOGY | Facility: MEDICAL CENTER | Age: 27
End: 2017-05-19
Attending: FAMILY MEDICINE
Payer: COMMERCIAL

## 2017-05-19 ENCOUNTER — HOSPITAL ENCOUNTER (OUTPATIENT)
Dept: CARDIOLOGY | Facility: MEDICAL CENTER | Age: 27
End: 2017-05-19
Attending: FAMILY MEDICINE
Payer: COMMERCIAL

## 2017-05-19 DIAGNOSIS — R55 SYNCOPE, UNSPECIFIED SYNCOPE TYPE: ICD-10-CM

## 2017-05-19 PROCEDURE — 700102 HCHG RX REV CODE 250 W/ 637 OVERRIDE(OP)

## 2017-05-19 PROCEDURE — 93880 EXTRACRANIAL BILAT STUDY: CPT

## 2017-05-19 PROCEDURE — A9270 NON-COVERED ITEM OR SERVICE: HCPCS

## 2017-05-19 RX ORDER — NITROGLYCERIN 0.4 MG/1
TABLET SUBLINGUAL
Status: DISPENSED
Start: 2017-05-19 | End: 2017-05-19

## 2017-05-19 NOTE — PROGRESS NOTES
Patient underwent an elective tilt table.  Informed consent obtained. 20g PIV obtained RAC.    Baseline resting vitals supine 125/76 SB 57  Patient has ST elevation II, AVF, V4, V5.  Dr. Dimitri Carter notified and EKG reviewed with him prior to text beginning.  Per Dr. Carter ok to continue.  Resting standing vitals 135/67 SR 75 No change in EKG                                        117/69 SR 75                                        109/81 SR 75                                        110/79 SR 81 Patient reported feeling spacy and back of neck hurts.                                         116/79 SR 79 Nitroglycerin 0.4mg sub q given                                         114/64 ST 99 patient reports legs hurt                                         110/53                                          108/69 ST 97 reports head is cloudy                                          105/80 ST 98  Test concluded, patient did not have a syncopal episode.                                            112/66 SR 73 reports feeling tired                                          104/61 SR 63                                           108/57 SR 67                                           112/64 SR 65                                          104/66 SR 62  Discharge instructions reviewed with patient.  Instructed to call MD to review til table results and POC.  All questions answered.   Patient ambulated out in a stable condition.  IV removed, tip intact.

## 2017-05-23 NOTE — PROCEDURES
DATE OF SERVICE:  05/19/2017 5/23/2017TILT TABLE TEST    DATE OF PROCEDURE:  05/19/2017    At baseline, the patient's blood pressure was 125/76 with a heart rate of 57   beats per minute.  EKG showed sinus bradycardia at 59 beats per minute with   J-point elevation.  During the test, he experienced multiple symptoms   including back pain, feeling spacey, legs hurting, left arm hurting.  There   were no changes in heart rate or blood pressure during these symptoms.    Nitroglycerin was given and there was no significant change in blood pressure   or heart rate.  In recovery, blood pressure was 104/66 with a heart rate of   62.  There were no syncopal events experienced.    CONCLUSIONS:  Negative tilt table test.       ____________________________________     MD JOSUE Barnes / MAX    DD:  05/23/2017 07:42:09  DT:  05/23/2017 07:55:38    D#:  [id number]  Job#:  [id number]

## 2017-05-25 ENCOUNTER — OFFICE VISIT (OUTPATIENT)
Dept: MEDICAL GROUP | Facility: MEDICAL CENTER | Age: 27
End: 2017-05-25
Payer: COMMERCIAL

## 2017-05-25 VITALS
SYSTOLIC BLOOD PRESSURE: 92 MMHG | OXYGEN SATURATION: 95 % | TEMPERATURE: 98.2 F | HEART RATE: 67 BPM | HEIGHT: 72 IN | BODY MASS INDEX: 26.33 KG/M2 | DIASTOLIC BLOOD PRESSURE: 60 MMHG | WEIGHT: 194.4 LBS

## 2017-05-25 DIAGNOSIS — G71.01 BECKER'S MUSCULAR DYSTROPHY (HCC): ICD-10-CM

## 2017-05-25 PROCEDURE — 99214 OFFICE O/P EST MOD 30 MIN: CPT | Performed by: FAMILY MEDICINE

## 2017-05-25 NOTE — ASSESSMENT & PLAN NOTE
Patient continues to have bilateral lower extremity weakness and diffuse pain/myalgias. We reviewed his recent diagnostic workup which is negative for tilt table testing, negative for carotid ultrasound Doppler, echocardiogram within normal limits, nerve conduction study within normal limits, and his blood work also within normal limits (CBC, CMP, TSH).    Patient continues to state that he has stopped alcohol, and he is not smoking or drinking. Initially, the patient denies any illicit drug use. Lastly, patient denies any foreign travel.     ROS is NEGATIVE for confusion, altered mentation, word finding difficulty, memory loss, facial droop, dysarthria, dysphagia, hemiplegia, gait instability.     ROS is NEGATIVE for dizziness, generalized weakness/fatigue, vision/hearing changes, jaw pain/paresthesias, BUE pain/paresthesias/numbness/weakness, chest pain/pressure, palpitations, dyspnea, RUQ abdominal pain, oliguria/anuria, BLE edema.     ROS is POSITIVE for lightheadedness, otherwise is NEGATIVE for generalized weakness/fatigue, generalized pallor, dizziness, blurred vision, chest pain/pressure, palpitations, tachycardia, dyspnea, hematemesis, hemoptysis, diarrhea, hematochezia, melena, hematuria, hand and foot tingling.

## 2017-05-25 NOTE — PROGRESS NOTES
Subjective:     Chief Complaint   Patient presents with   • Results     US/EMG       History of Present Illness:  Cal Delgadillo is a 27 y.o. male established patient who presents today to review diagnostic workup for his presumed muscular dystrophy:    Valentine's muscular dystrophy  Patient continues to have bilateral lower extremity weakness and diffuse pain/myalgias. We reviewed his recent diagnostic workup which is negative for tilt table testing, negative for carotid ultrasound Doppler, echocardiogram within normal limits, nerve conduction study within normal limits, and his blood work also within normal limits (CBC, CMP, TSH).    Patient continues to state that he has stopped alcohol, and he is not smoking or drinking. Initially, the patient denies any illicit drug use. Lastly, patient denies any foreign travel.     ROS is NEGATIVE for confusion, altered mentation, word finding difficulty, memory loss, facial droop, dysarthria, dysphagia, hemiplegia, gait instability.     ROS is NEGATIVE for dizziness, generalized weakness/fatigue, vision/hearing changes, jaw pain/paresthesias, BUE pain/paresthesias/numbness/weakness, chest pain/pressure, palpitations, dyspnea, RUQ abdominal pain, oliguria/anuria, BLE edema.     ROS is POSITIVE for lightheadedness, otherwise is NEGATIVE for generalized weakness/fatigue, generalized pallor, dizziness, blurred vision, chest pain/pressure, palpitations, tachycardia, dyspnea, hematemesis, hemoptysis, diarrhea, hematochezia, melena, hematuria, hand and foot tingling.        Patient Active Problem List    Diagnosis Date Noted   • Valentine's muscular dystrophy 05/01/2017   • Syncope 04/24/2017   • Post-nasal drip 02/07/2017       Additional History:   Allergies:    Septra ds     Current Medications:     No current outpatient prescriptions on file.     No current facility-administered medications for this visit.        Social History:     Social History   Substance Use Topics   •  "Smoking status: Former Smoker     Types: Cigarettes     Quit date: 02/07/2015   • Smokeless tobacco: Former User     Types: Chew     Quit date: 02/07/2015   • Alcohol Use: 2.4 oz/week     2 Cans of beer, 2 Shots of liquor per week       ROS:     - NOTE: All other systems reviewed and are negative, except as in HPI.     Objective:   Physical Exam:    Vitals: Blood pressure 92/60, pulse 67, temperature 36.8 °C (98.2 °F), height 1.829 m (6' 0.01\"), weight 88.179 kg (194 lb 6.4 oz), SpO2 95 %.   BMI: Body mass index is 26.36 kg/(m^2).   General/Constitutional: Vitals as above, Well nourished, well developed male in no acute distress   Head/Eyes: Head is grossly normal & atraumatic, bilateral conjunctivae clear and not injected, bilateral EOMI, bilateral PERRL   ENT: Bilateral external ears grossly normal in appearance, Hearing grossly intact, External nares normal in appearance and without discharge/bleeding   Respiratory: No respiratory distress, bilateral lungs are clear to ausculation in all lung fields (anterior/lateral/posterior), no wheezing/rhonchi/rales   Cardiovascular: Regular rate and rhythm without murmur/gallops/rubs, distal pulses are intact and equal bilaterally (radial, posterior tibial), no bilateral lower extremity edema   MSK: Gait grossly normal & not antalgic   Integumentary: No apparent rashes   Psych: Judgment grossly appropriate, no apparent depression/anxiety    Imaging/Labs:      - Labs and imaging are reviewed and interpreted as in history of present illness above    Assessment and Plan:   1. Valentine's muscular dystrophy  This is the suspected diagnoses based off of evaluation by neurologist, Dr. Hernandez. Symptoms are currently uncontrolled, and patient states that he has the DNA testing to be performed later today. Patient was given work restrictions letter, and is instructed to follow up with Dr. Hernandez as scheduled on July 28, 2017 at 2:40 PM.      RTC: as needed.    PLEASE NOTE: This " dictation was created using voice recognition software. I have made every reasonable attempt to correct obvious errors, but I expect that there are errors of grammar and possibly content that I did not discover before finalizing the note.

## 2017-05-25 NOTE — MR AVS SNAPSHOT
"        Cal Delgadillo   2017 7:20 AM   Office Visit   MRN: [id number]    Department:  [place] [address]   Dept Phone:  [phone]    Description:  Male : 1990   Provider:  Slim Pringle M.D.           Reason for Visit     Results US/EMG      Allergies as of 2017     Allergen Noted Reactions    Septra Ds [Bactrim Ds] 2016   Unspecified    Pt was very young, doesn't remember reaction.      You were diagnosed with     Valentine's muscular dystrophy   [[id number]]         Vital Signs     Blood Pressure Pulse Temperature Height Weight Body Mass Index    92/60 mmHg 67 36.8 °C (98.2 °F) 1.829 m (6' 0.01\") 88.179 kg (194 lb 6.4 oz) 26.36 kg/m2    Oxygen Saturation Smoking Status                95% Former Smoker          Basic Information     Date Of Birth Sex Race Ethnicity Preferred Language    1990 Male White Non- English      Your appointments     2017  2:40 PM   Follow Up Visit with Chris Magallon M.D.   [facility]rology (--)    [address] [place] [address], [address]  [facility] [id number] [phone]           You will be receiving a confirmation call a few days before your appointment from our automated call confirmation system.              Problem List              ICD-10-CM Priority Class Noted - Resolved    Post-nasal drip R09.82   2017 - Present    Syncope R55   2017 - Present    Valentine's muscular dystrophy G71.0   2017 - Present      Health Maintenance        Date Due Completion Dates    IMM HEP B VACCINE (1 of 3 - Primary Series) 1990 ---    IMM HEP A VACCINE (1 of 2 - Standard Series) 1991 ---    IMM VARICELLA (CHICKENPOX) VACCINE (1 of 2 - 2 Dose Adolescent Series) 2003 ---    IMM DTaP/Tdap/Td Vaccine (1 - Tdap) 2009 ---            Current Immunizations     No immunizations on file.      Below and/or attached are the medications your provider expects you to take. Review all of your home medications and newly ordered " medications with your provider and/or pharmacist. Follow medication instructions as directed by your provider and/or pharmacist. Please keep your medication list with you and share with your provider. Update the information when medications are discontinued, doses are changed, or new medications (including over-the-counter products) are added; and carry medication information at all times in the event of emergency situations     Allergies:  SEPTRA DS - Unspecified               Medications  Valid as of: May 25, 2017 -  7:48 AM    Generic Name Brand Name Tablet Size Instructions for use    .                 Medicines prescribed today were sent to:     [facility] [address] [facility] [place] - [address] [facility]    [address] [facility] [place] [address]    Phone: [phone] Fax: [phone]    Open 24 Hours?: Yes      Medication refill instructions:       If your prescription bottle indicates you have medication refills left, it is not necessary to call your provider’s office. Please contact your pharmacy and they will refill your medication.    If your prescription bottle indicates you do not have any refills left, you may request refills at any time through one of the following ways: The online [employer] system (except Urgent Care), by calling your provider’s office, or by asking your pharmacy to contact your provider’s office with a refill request. Medication refills are processed only during regular business hours and may not be available until the next business day. Your provider may request additional information or to have a follow-up visit with you prior to refilling your medication.   *Please Note: Medication refills are assigned a new Rx number when refilled electronically. Your pharmacy may indicate that no refills were authorized even though a new prescription for the same medication is available at the pharmacy. Please request the medicine by name with the pharmacy before contacting  your provider for a refill.           MyChart Access Code: Activation code not generated  Current MyChart Status: Active

## 2017-05-25 NOTE — Clinical Note
May 25, 2017         Patient: Cal Delgadillo   YOB: 1990   Date of Visit: 5/25/2017           To Whom it May Concern:     Cal Delgadillo was seen in my clinic on 05/25/2017. He is cleared to return to work.  However, I am recommending continued work restrictions to lighter duty (lifting weights/loads <20% of the time, with loads no more than 20-30lbs, no lifting overhead, limited crouching to ~10-20% of the time).  I am recommending this extension of work restrictions until he gets cleared either by myself or by his Neurologist.  He has a follow-up appointment with the Neurologist on 07/28/2017.     If you have any questions or concerns, please don't hesitate to call.        Sincerely,           Slim Pringle M.D.  Electronically Signed

## 2017-06-21 ENCOUNTER — TELEPHONE (OUTPATIENT)
Dept: NEUROLOGY | Facility: MEDICAL CENTER | Age: 27
End: 2017-06-21

## 2017-06-21 NOTE — TELEPHONE ENCOUNTER
Message: patient would like to get results of Radha genetic testing. Results are scanned into media.    Caller: isabell  Call Back #: [phone]   OK to leave detailed message: n

## 2017-06-22 NOTE — TELEPHONE ENCOUNTER
Called and spoke with pt. All information was given and pt and she verbally understood and will comply with all given. ILAN

## 2017-06-22 NOTE — TELEPHONE ENCOUNTER
Chris Magallon M.D.  Adeline Danielle, [facility]       Caller: Unspecified (Yesterday, 2:29 PM)                     Tell the patient that the genetic markers for muscular dystrophy were negative. This is always a good sign, though we still need to deal with the symptoms that he is suffering from.

## 2017-07-28 ENCOUNTER — OFFICE VISIT (OUTPATIENT)
Dept: NEUROLOGY | Facility: MEDICAL CENTER | Age: 27
End: 2017-07-28
Payer: COMMERCIAL

## 2017-07-28 VITALS
SYSTOLIC BLOOD PRESSURE: 130 MMHG | DIASTOLIC BLOOD PRESSURE: 68 MMHG | TEMPERATURE: 98.1 F | WEIGHT: 200 LBS | HEART RATE: 83 BPM | BODY MASS INDEX: 27.09 KG/M2 | HEIGHT: 72 IN | RESPIRATION RATE: 16 BRPM | OXYGEN SATURATION: 95 %

## 2017-07-28 DIAGNOSIS — G71.01 BECKER'S MUSCULAR DYSTROPHY (HCC): Primary | ICD-10-CM

## 2017-07-28 PROCEDURE — 99213 OFFICE O/P EST LOW 20 MIN: CPT | Performed by: PSYCHIATRY & NEUROLOGY

## 2017-07-28 ASSESSMENT — PAIN SCALES - GENERAL: PAINLEVEL: NO PAIN

## 2017-07-28 NOTE — MR AVS SNAPSHOT
Cal Delgadillo   2017 2:40 PM   Office Visit   MRN: [id number]    Department:  Neurology [facility]   Dept Phone:  [phone]    Description:  Male : 1990   Provider:  Chris Magallon M.D.           Reason for Visit     Follow-Up 3m FV, ruth's muscular dystrophy      Allergies as of 2017     Allergen Noted Reactions    Septra Ds [Bactrim Ds] 2016   Unspecified    Pt was very young, doesn't remember reaction.      Vital Signs     Blood Pressure Pulse Temperature Respirations Height Weight    130/68 mmHg 83 36.7 °C (98.1 °F) 16 1.829 m (6') 90.719 kg (200 lb)    Body Mass Index Oxygen Saturation Smoking Status             27.12 kg/m2 95% Current Every Day Smoker         Basic Information     Date Of Birth Sex Race Ethnicity Preferred Language    1990 Male White Non- English      Your appointments     2017  4:40 PM   Follow Up Visit with Chris Magallon M.D.   [facility] (--)    [address] [place] [address], [address]  [facility] [id number] [phone]           You will be receiving a confirmation call a few days before your appointment from our automated call confirmation system.              Problem List              ICD-10-CM Priority Class Noted - Resolved    Post-nasal drip R09.82   2017 - Present    Syncope R55   2017 - Present    Ruth's muscular dystrophy G71.0   2017 - Present      Health Maintenance        Date Due Completion Dates    IMM HEP B VACCINE (1 of 3 - Primary Series) 1990 ---    IMM HEP A VACCINE (1 of 2 - Standard Series) 1991 ---    IMM VARICELLA (CHICKENPOX) VACCINE (1 of 2 - 2 Dose Adolescent Series) 2003 ---    IMM DTaP/Tdap/Td Vaccine (1 - Tdap) 2009 ---    IMM INFLUENZA (1) 2017 ---            Current Immunizations     No immunizations on file.      Below and/or attached are the medications your provider expects you to take. Review all of your home medications and newly ordered  medications with your provider and/or pharmacist. Follow medication instructions as directed by your provider and/or pharmacist. Please keep your medication list with you and share with your provider. Update the information when medications are discontinued, doses are changed, or new medications (including over-the-counter products) are added; and carry medication information at all times in the event of emergency situations     Allergies:  SEPTRA DS - Unspecified               Medications  Valid as of: July 28, 2017 -  3:17 PM    Generic Name Brand Name Tablet Size Instructions for use    .                 Medicines prescribed today were sent to:     [facility] [id number] [facility], [place] - [address]    [address] [place] [address]    Phone: [phone] Fax: [phone]    Open 24 Hours?: Yes      Medication refill instructions:       If your prescription bottle indicates you have medication refills left, it is not necessary to call your provider’s office. Please contact your pharmacy and they will refill your medication.    If your prescription bottle indicates you do not have any refills left, you may request refills at any time through one of the following ways: The online Click Contact system (except Urgent Care), by calling your provider’s office, or by asking your pharmacy to contact your provider’s office with a refill request. Medication refills are processed only during regular business hours and may not be available until the next business day. Your provider may request additional information or to have a follow-up visit with you prior to refilling your medication.   *Please Note: Medication refills are assigned a new Rx number when refilled electronically. Your pharmacy may indicate that no refills were authorized even though a new prescription for the same medication is available at the pharmacy. Please request the medicine by name with the pharmacy before  contacting your provider for a refill.           MyChart Access Code: Activation code not generated  Current MyChart Status: Active          Quit Tobacco Information     Do you want to quit using tobacco?    Quitting tobacco decreases risks of cancer, heart and lung disease, increases life expectancy, improves sense of taste and smell, and increases spending money, among other benefits.    If you are thinking about quitting, we can help.  • [facility] Quit Tobacco Program: 788.339.1336  o Program occurs weekly for four weeks and includes pharmacist consultation on products to support quitting smoking or chewing tobacco. A provider referral is needed for pharmacist consultation.  • Tobacco Users Help Hotline: 5-694-QUIT-NOW (733-8431) or https://[place].quitlogix.org/  o Free, confidential telephone and online coaching for [place] residents. Sessions are designed on a schedule that is convenient for you. Eligible clients receive free nicotine replacement therapy.  • Nationally: www.smokefree.gov  o Information and professional assistance to support both immediate and long-term needs as you become, and remain, a non-smoker. Smokefree.gov allows you to choose the help that best fits your needs.

## 2017-07-28 NOTE — PROGRESS NOTES
Subjective:      Cal Delgadillo is a 27 y.o. 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    HPI    Clinically, the patient states that he actually has had some improvement with his weakness, now that he is smoking regular cigarettes, no longer vaping. Weakness is still there, but it is clearly more profound. This has happened to him before, but he failed to mention this to me until now. Diagnostically, EMG studies were completely normal. Also unusual, his genetic screen for Valentine's muscular dystrophy was negative! He states that his brother did have genetics done, he tested positive, his mother has a carrier state, evidently she is developing some weakness as she has gotten older. Her genetics also were positive for Valentine's.    Medical, surgical and family histories reviewed, there are no new drug allergies. He is presently on no medications.    Review of Systems   All other systems reviewed and are negative.       Objective:     /68 mmHg  Pulse 83  Temp(Src) 36.7 °C (98.1 °F)  Resp 16  Ht 1.829 m (6')  Wt 90.719 kg (200 lb)  BMI 27.12 kg/m2  SpO2 95%     Physical Exam    He appears in no acute distress. Vital signs are stable. There is no malar rash. The neck is supple. Exam was performed in quick and cursory fashion, revealing no real deficits nor changes from his previous examination of 3 months ago.     Assessment/Plan:     1. Valentine's muscular dystrophy  It is almost unheard of that somebody with Valentine's muscular dystrophy will test negative on genetics, but there are other myotonic dystrophies including Limb Girdle, Fascioscapulohumeral as well as myotonic dystrophies, though I doubt the latter is really playing a role. Additional genetics will be ordered for the first two possibilities, as there are rare variants that can be detected. In any case, all we can do at this time is observe. A normal EMG does not rule out any type of myopathic  process especially milder forms of illness such as that seen in Valentine's muscular dystrophy. On the other hand, this may all be a blind alley, and in fact he may not have myopathic disease at all.    Time: Evaluation of 20 minutes for exam come review, discussion, and education  Discussion: As mentioned in the assessment, over 80% of the time spent face-to-face counseling and coordination

## 2017-09-11 ENCOUNTER — OFFICE VISIT (OUTPATIENT)
Dept: MEDICAL GROUP | Facility: MEDICAL CENTER | Age: 27
End: 2017-09-11
Payer: COMMERCIAL

## 2017-09-11 VITALS
TEMPERATURE: 99.2 F | OXYGEN SATURATION: 97 % | DIASTOLIC BLOOD PRESSURE: 72 MMHG | HEART RATE: 66 BPM | BODY MASS INDEX: 28.25 KG/M2 | HEIGHT: 72 IN | RESPIRATION RATE: 16 BRPM | WEIGHT: 208.56 LBS | SYSTOLIC BLOOD PRESSURE: 116 MMHG

## 2017-09-11 DIAGNOSIS — Z13.6 ENCOUNTER FOR LIPID SCREENING FOR CARDIOVASCULAR DISEASE: ICD-10-CM

## 2017-09-11 DIAGNOSIS — Z00.00 ROUTINE GENERAL MEDICAL EXAMINATION AT A HEALTH CARE FACILITY: ICD-10-CM

## 2017-09-11 DIAGNOSIS — Z82.0 FAMILY HISTORY OF MUSCULAR DYSTROPHY: ICD-10-CM

## 2017-09-11 DIAGNOSIS — Z23 NEED FOR INFLUENZA VACCINATION: ICD-10-CM

## 2017-09-11 DIAGNOSIS — Z13.220 ENCOUNTER FOR LIPID SCREENING FOR CARDIOVASCULAR DISEASE: ICD-10-CM

## 2017-09-11 DIAGNOSIS — F17.200 LIGHT TOBACCO SMOKER: ICD-10-CM

## 2017-09-11 PROBLEM — R09.82 POST-NASAL DRIP: Status: RESOLVED | Noted: 2017-02-07 | Resolved: 2017-09-11

## 2017-09-11 PROCEDURE — 99406 BEHAV CHNG SMOKING 3-10 MIN: CPT | Performed by: FAMILY MEDICINE

## 2017-09-11 PROCEDURE — 99214 OFFICE O/P EST MOD 30 MIN: CPT | Mod: 25 | Performed by: FAMILY MEDICINE

## 2017-09-11 NOTE — ASSESSMENT & PLAN NOTE
Patient states that he is feeling much better now that he is no longer vaping, has returned to smoking cigarettes, instead.  Patient states that he needs to smoke cigarettes to handle stress, mainly due to stress at home. However, he and wife are making plans on how to manage the stress better. Specifically, wife is pursuing referral to behavioral health.    Review of medical records reveals the patient is still seeing his neurologist, and that they are pursuing further genetic testing to determine if there may be other muscular dystrophy variants that he carries rather than Valentine's. Up to this point, patient's Valentine's genetic testing as well as EMG were negative.    Patient is requesting to have restricted duty lifted.    ROS is NEGATIVE for confusion, altered mentation, word finding difficulty, memory loss, facial droop, dysarthria, dysphagia, hemiplegia, gait instability.      ROS is NEGATIVE for dizziness, generalized weakness/fatigue, vision/hearing changes, jaw pain/paresthesias, BUE pain/paresthesias/numbness/weakness, chest pain/pressure, palpitations, dyspnea, RUQ abdominal pain, oliguria/anuria, BLE edema.

## 2017-09-11 NOTE — LETTER
September 11, 2017         Patient: Cal Delgadillo   YOB: 1990   Date of Visit: 9/11/2017           To Whom it May Concern:     Cal Delgadillo was seen in my clinic on 09/11/2017.  He may return to work on 09/11/2017.  Furthermore, as of this date and time, I see no continued need to have patient be on work restrictions. However, he is still under the care of neurologist. Therefore, work restrictions may be implemented again in the future should the neurologist advised to do so, or the evaluation by the neurologist indicate that this is necessary.     If you have any questions or concerns, please don't hesitate to call.        Sincerely,           Slim Pringle M.D.  Electronically Signed

## 2017-09-11 NOTE — ASSESSMENT & PLAN NOTE
Patient smokes 5-8cigarettes per day, patient would like to stop smoking.  Please see assessment/plan as below for further details.

## 2017-09-11 NOTE — PROGRESS NOTES
Subjective:     Chief Complaint   Patient presents with   • Other     needing Dr note for work        History of Present Illness:  Cal Delgadillo is a 27 y.o. male established patient who presents today to have his work restrictions lifted:    Family history of muscular dystrophy  Patient states that he is feeling much better now that he is no longer vaping, has returned to smoking cigarettes, instead.  Patient states that he needs to smoke cigarettes to handle stress, mainly due to stress at home. However, he and wife are making plans on how to manage the stress better. Specifically, wife is pursuing referral to behavioral health.    Review of medical records reveals the patient is still seeing his neurologist, and that they are pursuing further genetic testing to determine if there may be other muscular dystrophy variants that he carries rather than Valentine's. Up to this point, patient's Valentine's genetic testing as well as EMG were negative.    Patient is requesting to have restricted duty lifted.    ROS is NEGATIVE for confusion, altered mentation, word finding difficulty, memory loss, facial droop, dysarthria, dysphagia, hemiplegia, gait instability.      ROS is NEGATIVE for dizziness, generalized weakness/fatigue, vision/hearing changes, jaw pain/paresthesias, BUE pain/paresthesias/numbness/weakness, chest pain/pressure, palpitations, dyspnea, RUQ abdominal pain, oliguria/anuria, BLE edema.       Patient Active Problem List    Diagnosis Date Noted   • Family history of muscular dystrophy 05/01/2017   • Syncope 04/24/2017       Additional History:   Allergies:    Septra ds [bactrim ds]     Current Medications:     No current outpatient prescriptions on file.     No current facility-administered medications for this visit.         Social History:     Social History   Substance Use Topics   • Smoking status: Current Every Day Smoker     Packs/day: 0.50     Types: Cigarettes   • Smokeless tobacco: Never Used    • Alcohol use 2.4 oz/week     2 Cans of beer, 2 Shots of liquor per week       ROS:     - NOTE: All other systems reviewed and are negative, except as in HPI.     Objective:   Physical Exam:    Vitals: Blood pressure 116/72, pulse 66, temperature 37.3 °C (99.2 °F), resp. rate 16, height 1.829 m (6'), weight 94.6 kg (208 lb 8.9 oz), SpO2 97 %.   BMI: Body mass index is 28.29 kg/m².   General/Constitutional: Vitals as above, Well nourished, well developed male in no acute distress   Head/Eyes: Head is grossly normal & atraumatic, bilateral conjunctivae clear and not injected, bilateral EOMI, bilateral PERRL   ENT: Bilateral external ears grossly normal in appearance, Hearing grossly intact, External nares normal in appearance and without discharge/bleeding   Respiratory: No respiratory distress, bilateral lungs are clear to ausculation in all lung fields (anterior/lateral/posterior), no wheezing/rhonchi/rales   Cardiovascular: Regular rate and rhythm without murmur/gallops/rubs, distal pulses are intact and equal bilaterally (radial, posterior tibial), no bilateral lower extremity edema   MSK: Gait grossly normal & not antalgic   Integumentary: No apparent rashes   Psych: Judgment grossly appropriate, no apparent depression/anxiety    Health Maintenance:      - Vaccinations may not be uptodate    Imaging/Labs:      - Last CPK in 03/2017 was elevated    Assessment and Plan:   1. Family history of muscular dystrophy  Uncontrolled.  Diabetic creatinine kinase, as patient is currently not experiencing any myalgias or arthralgias.   - CREATINE KINASE; Future    2. Need for influenza vaccination  Patient declined at present, stating that he can get this vaccination at work for free.     3. Light tobacco smoker  Uncontrolled.   A) Discussion: Patient and I discussed various methods for smoking cessation including pharmacological supports (Nicotine replacement therapy, Chantix vs. Zyban), smoking cessation groups, smoking  hotlines, as well as abrupt cessation over gradual cessation (in addition to setting quit dates; ref: [employer]. doi:10.4580/H64-2433).  We discussed that number of lifetime quit attempts vary (http://dx.doi.org/10.1136/vqljcvd-6375-454661) but that probability of success increases when combining methods (pharmacologic + behavioral modification).      B) Stage of change: Planning/Action   C) Total time of discussion: 3minutes   D) Next step: NRT + Setting Quit Date    RTC: in 05/2018 for Annual Physical Exam.    PLEASE NOTE: This dictation was created using voice recognition software. I have made every reasonable attempt to correct obvious errors, but I expect that there are errors of grammar and possibly content that I did not discover before finalizing the note.

## 2017-09-15 ENCOUNTER — HOSPITAL ENCOUNTER (OUTPATIENT)
Dept: LAB | Facility: MEDICAL CENTER | Age: 27
End: 2017-09-15
Attending: FAMILY MEDICINE
Payer: COMMERCIAL

## 2017-09-15 DIAGNOSIS — Z13.220 ENCOUNTER FOR LIPID SCREENING FOR CARDIOVASCULAR DISEASE: ICD-10-CM

## 2017-09-15 DIAGNOSIS — Z00.00 ROUTINE GENERAL MEDICAL EXAMINATION AT A HEALTH CARE FACILITY: ICD-10-CM

## 2017-09-15 DIAGNOSIS — Z13.6 ENCOUNTER FOR LIPID SCREENING FOR CARDIOVASCULAR DISEASE: ICD-10-CM

## 2017-09-15 DIAGNOSIS — Z82.0 FAMILY HISTORY OF MUSCULAR DYSTROPHY: ICD-10-CM

## 2017-09-15 LAB
CHOLEST SERPL-MCNC: 202 MG/DL (ref 100–199)
CK SERPL-CCNC: 167 U/L (ref 0–154)
HDLC SERPL-MCNC: 40 MG/DL
LDLC SERPL CALC-MCNC: 130 MG/DL
TRIGL SERPL-MCNC: 158 MG/DL (ref 0–149)

## 2017-09-15 PROCEDURE — 80061 LIPID PANEL: CPT

## 2017-09-15 PROCEDURE — 82550 ASSAY OF CK (CPK): CPT

## 2017-09-15 PROCEDURE — 36415 COLL VENOUS BLD VENIPUNCTURE: CPT

## 2017-09-17 PROBLEM — E78.2 MIXED DYSLIPIDEMIA: Status: ACTIVE | Noted: 2017-09-17

## 2017-11-16 ENCOUNTER — APPOINTMENT (OUTPATIENT)
Dept: NEUROLOGY | Facility: MEDICAL CENTER | Age: 27
End: 2017-11-16
Payer: COMMERCIAL